# Patient Record
Sex: FEMALE | Race: WHITE | NOT HISPANIC OR LATINO | Employment: OTHER | URBAN - METROPOLITAN AREA
[De-identification: names, ages, dates, MRNs, and addresses within clinical notes are randomized per-mention and may not be internally consistent; named-entity substitution may affect disease eponyms.]

---

## 2019-01-23 ENCOUNTER — OFFICE VISIT (OUTPATIENT)
Dept: MEDICAL GROUP | Facility: MEDICAL CENTER | Age: 69
End: 2019-01-23
Payer: MEDICARE

## 2019-01-23 ENCOUNTER — HOSPITAL ENCOUNTER (OUTPATIENT)
Facility: MEDICAL CENTER | Age: 69
End: 2019-01-23
Attending: INTERNAL MEDICINE
Payer: MEDICARE

## 2019-01-23 VITALS
OXYGEN SATURATION: 93 % | HEIGHT: 65 IN | DIASTOLIC BLOOD PRESSURE: 80 MMHG | WEIGHT: 141 LBS | SYSTOLIC BLOOD PRESSURE: 130 MMHG | HEART RATE: 109 BPM | TEMPERATURE: 99.4 F | BODY MASS INDEX: 23.49 KG/M2

## 2019-01-23 DIAGNOSIS — E06.3 HASHIMOTO'S DISEASE: ICD-10-CM

## 2019-01-23 DIAGNOSIS — Z12.11 SCREENING FOR COLON CANCER: ICD-10-CM

## 2019-01-23 DIAGNOSIS — Z23 NEED FOR VACCINATION: ICD-10-CM

## 2019-01-23 DIAGNOSIS — N89.8 VAGINAL ITCHING: ICD-10-CM

## 2019-01-23 DIAGNOSIS — J44.9 CHRONIC OBSTRUCTIVE PULMONARY DISEASE, UNSPECIFIED COPD TYPE (HCC): ICD-10-CM

## 2019-01-23 DIAGNOSIS — F17.200 TOBACCO DEPENDENCE: ICD-10-CM

## 2019-01-23 LAB
APPEARANCE UR: CLEAR
BILIRUB UR STRIP-MCNC: NEGATIVE MG/DL
COLOR UR AUTO: YELLOW
GLUCOSE UR STRIP.AUTO-MCNC: NEGATIVE MG/DL
KETONES UR STRIP.AUTO-MCNC: NEGATIVE MG/DL
LEUKOCYTE ESTERASE UR QL STRIP.AUTO: NORMAL
NITRITE UR QL STRIP.AUTO: NEGATIVE
PH UR STRIP.AUTO: 6.5 [PH] (ref 5–8)
PROT UR QL STRIP: NEGATIVE MG/DL
RBC UR QL AUTO: NEGATIVE
SP GR UR STRIP.AUTO: 1.01
UROBILINOGEN UR STRIP-MCNC: NORMAL MG/DL

## 2019-01-23 PROCEDURE — 87510 GARDNER VAG DNA DIR PROBE: CPT

## 2019-01-23 PROCEDURE — 87660 TRICHOMONAS VAGIN DIR PROBE: CPT

## 2019-01-23 PROCEDURE — 81002 URINALYSIS NONAUTO W/O SCOPE: CPT | Performed by: INTERNAL MEDICINE

## 2019-01-23 PROCEDURE — 90715 TDAP VACCINE 7 YRS/> IM: CPT | Performed by: INTERNAL MEDICINE

## 2019-01-23 PROCEDURE — 99204 OFFICE O/P NEW MOD 45 MIN: CPT | Mod: 25 | Performed by: INTERNAL MEDICINE

## 2019-01-23 PROCEDURE — 90471 IMMUNIZATION ADMIN: CPT | Performed by: INTERNAL MEDICINE

## 2019-01-23 PROCEDURE — 87480 CANDIDA DNA DIR PROBE: CPT

## 2019-01-23 ASSESSMENT — PATIENT HEALTH QUESTIONNAIRE - PHQ9: CLINICAL INTERPRETATION OF PHQ2 SCORE: 0

## 2019-01-23 NOTE — PROGRESS NOTES
CC:  Diagnoses of Hashimoto's disease, Screening for colon cancer, Need for vaccination, Vaginal itching, and Tobacco dependence were pertinent to this visit.    HISTORY OF THE PRESENT ILLNESS: Patient is a 68 y.o. female. This pleasant patient is here today to discuss her vaginal/anal symptoms.    Health Maintenance: Her mammogram report will be scanned it was normal 12/8/17, she does not wish to repeat it at this time, asymptomatic.  She says she is due for colonoscopy this was ordered.  She says she is also due for Tdap, this was ordered.  She is already had shingles and Prevnar vaccines, this will be updated.    Recently moved back to [facility] from [place].  For the last year she has had itching and burning intermittently in her vaginal anal region associated with occasionally a fishlike odor and yellow to brown discharge.  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  Most recently she has been treated with oral fluconazole, nystatin another steroid/fungal creams and currently using over-the-counter antifungal/bacterial medication.  She denies any symptoms that she feels is overtly suggestive of UTI skin is a little sore because of redness with urination.  Pending gynecology appointment with Dr. Stephenson in April, she would like to have a sooner gynecology evaluation.    COPD with history of 50-pack-year smoking history.  She is ready to quit smoking, she says she has patches at home.  In the past it sounds like she took Wellbutrin and experience a sensation of brain swelling.  She denies any active pulmonary symptoms, she does not feel she needs an inhaler.    Noted blood pressure and heart rate.  Heart rate is elevated because she rushed in the clinic.  She is formally an LPN and at home she checked her blood pressure is always around 115/70.    Has been maintained on levothyroxine for Hashimoto's disease,  currently she feels that she is probably euthyroid.      Allergies include tape; Augmentin; Ciprofloxacin; and Other misc    Current Outpatient Prescriptions Ordered in [facility]   Medication Sig Dispense Refill   • levothyroxine (LEVOXYL) 88 MCG TABS Take 88 mcg by mouth every day.       No current [facility]-ordered facility-administered medications on file.        Past Medical History:   Diagnosis Date   • Cancer (HCC)     hpv cervical   • Conjunctivitis    • COPD (chronic obstructive pulmonary disease) (HCC)    • Hypothyroid     Hashimoto's       Past Surgical History:   Procedure Laterality Date   • HYSTERECTOMY ROBOTIC  11/6/08    Performed by MARIANELA LAZO at SURGERY [facility] ORS   • CERVICAL CONIZATION  8/8/08    Performed by SEKOU PEDERSEN at SURGERY SAME DAY [facility] ORS   • ABDOMINAL HYSTERECTOMY TOTAL      2008   • KNEE ORIF      right   • NASAL POLYPECTOMY     • RECTAL POLYPECTOMY     • TONSILLECTOMY     • TUBAL LIGATION         Social History   Substance Use Topics   • Smoking status: Current Every Day Smoker     Packs/day: 1.00     Years: 50.00     Types: Cigarettes   • Smokeless tobacco: Not on file   • Alcohol use Yes      Comment: rarely approx once year       Social History     Social History Narrative   • No narrative on file       Family History   Problem Relation Age of Onset   • Cancer Mother         lung   • Cancer Father         lung   • Heart Disease Maternal Grandfather        ROS:     - Constitutional: Negative for fever, chills, unexpected weight change, and fatigue/generalized weakness.     - HEENT: Negative for headaches, vision changes, hearing changes, ear pain, ear discharge, rhinorrhea, sinus congestion, sore throat, and neck pain.      - Respiratory: Negative for cough, sputum production, chest congestion, dyspnea, wheezing, and crackles.      - Cardiovascular: Negative for chest pain, palpitations, orthopnea, and bilateral lower extremity edema.     - Gastrointestinal: Negative for  "heartburn, nausea, vomiting, abdominal pain, hematochezia, melena, diarrhea, constipation, and greasy/foul-smelling stools.     - Genitourinary:see hpi    - Musculoskeletal: Negative for myalgias, back pain, and joint pain.     - Skin: Negative for rash, itching, cyanotic skin color change.     - Neurological: Negative for dizziness, tingling, tremors, focal sensory deficit, focal weakness and headaches.     - Endo/Heme/Allergies: Does not bruise/bleed easily.     - Psychiatric/Behavioral: Negative for depression, suicidal/homicidal ideation and memory loss.          .      Exam: Blood pressure 130/80, pulse (!) 109, temperature 37.4 °C (99.4 °F), temperature source Temporal, height 1.651 m (5' 5\"), weight 64 kg (141 lb), SpO2 93 %. Body mass index is 23.46 kg/m².    General: Normal appearing. No distress.  HEENT: Normocephalic. Eyes conjunctiva clear lids without ptosis, pupils equal and reactive to light   Neck: Supple  Pulmonary: Clear to ausculation.  Normal effort. No rales, ronchi, or wheezing.  Cardiovascular: Regular rate and rhythm without murmur.   Abdomen: Soft, nontender, nondistended. Normal bowel sounds.   Neurologic: Cranial nerves grossly nonfocal  Skin: Warm and dry.  No obvious lesions.  Musculoskeletal: Normal gait. No extremity cyanosis, clubbing, or edema.  Psych: Normal mood and affect. Alert and oriented x3. Judgment and insight is normal.    Please note that this dictation was created using voice recognition software. I have made every reasonable attempt to correct obvious errors, but I expect that there are errors of grammar and possibly content that I did not discover before finalizing the note.    Medical decision making    1. Hashimoto's disease  Continue levothyroxine, chronic stable condition  - CBC WITH DIFFERENTIAL; Future  - COMP METABOLIC PANEL; Future  - Lipid Profile; Future  - TSH WITH REFLEX TO FT4; Future    2. Screening for colon cancer  Currently asymptomatic  - REFERRAL TO " GI FOR COLONOSCOPY    3. Need for vaccination  - Tdap Vaccine =>8YO IM    4. Vaginal itching, new issue  Will rule out bacterial vaginosis today.  Given her prolonged course of her symptoms and menopausal state patient would like to see a gynecologist.  - VAGINAL PATHOGENS DNA PANEL; Future  - REFERRAL TO GYNECOLOGY  - POCT Urinalysis    5. Tobacco dependence  Patient has nicotine patches, will monitor    6. Chronic obstructive pulmonary disease, unspecified COPD type (HCC)  Stable, chronic medical condition    Return to clinic 2 months

## 2019-01-24 DIAGNOSIS — N89.8 VAGINAL ITCHING: ICD-10-CM

## 2019-01-24 LAB
CANDIDA DNA VAG QL PROBE+SIG AMP: NEGATIVE
G VAGINALIS DNA VAG QL PROBE+SIG AMP: NEGATIVE
T VAGINALIS DNA VAG QL PROBE+SIG AMP: NEGATIVE

## 2019-01-28 ENCOUNTER — TELEPHONE (OUTPATIENT)
Dept: MEDICAL GROUP | Facility: MEDICAL CENTER | Age: 69
End: 2019-01-28

## 2019-01-28 NOTE — TELEPHONE ENCOUNTER
Phone Number Called: [phone] (home)      Message: Left message for patient to call us back about results.     Left Message for patient to call back: yes

## 2019-01-28 NOTE — TELEPHONE ENCOUNTER
----- Message from Danika Carty M.D. sent at 1/25/2019 12:44 PM PST -----  Please call pt, let her know test result was negative.

## 2019-01-29 NOTE — TELEPHONE ENCOUNTER
Phone Number Called: [phone] (home)      Message: Tried to call patient but now the phone number is not working. Unable to leave a message today.     Left Message for patient to call back: N\A

## 2019-03-04 ENCOUNTER — OFFICE VISIT (OUTPATIENT)
Dept: URGENT CARE | Facility: CLINIC | Age: 69
End: 2019-03-04
Payer: MEDICARE

## 2019-03-04 ENCOUNTER — APPOINTMENT (OUTPATIENT)
Dept: RADIOLOGY | Facility: IMAGING CENTER | Age: 69
End: 2019-03-04
Attending: PHYSICIAN ASSISTANT
Payer: MEDICARE

## 2019-03-04 VITALS
OXYGEN SATURATION: 93 % | TEMPERATURE: 97.9 F | BODY MASS INDEX: 23.32 KG/M2 | HEIGHT: 65 IN | HEART RATE: 90 BPM | RESPIRATION RATE: 20 BRPM | DIASTOLIC BLOOD PRESSURE: 80 MMHG | SYSTOLIC BLOOD PRESSURE: 120 MMHG | WEIGHT: 140 LBS

## 2019-03-04 DIAGNOSIS — R09.02 HYPOXIA: ICD-10-CM

## 2019-03-04 DIAGNOSIS — R05.3 PERSISTENT COUGH FOR 3 WEEKS OR LONGER: ICD-10-CM

## 2019-03-04 DIAGNOSIS — J44.1 COPD EXACERBATION (HCC): Primary | ICD-10-CM

## 2019-03-04 PROCEDURE — 99214 OFFICE O/P EST MOD 30 MIN: CPT | Performed by: PHYSICIAN ASSISTANT

## 2019-03-04 PROCEDURE — 71046 X-RAY EXAM CHEST 2 VIEWS: CPT | Mod: TC,FY | Performed by: PHYSICIAN ASSISTANT

## 2019-03-04 RX ORDER — DOXYCYCLINE HYCLATE 100 MG
100 TABLET ORAL 2 TIMES DAILY
Qty: 10 TAB | Refills: 0 | Status: SHIPPED | OUTPATIENT
Start: 2019-03-04 | End: 2019-03-09

## 2019-03-04 RX ORDER — PREDNISONE 20 MG/1
TABLET ORAL
Qty: 15 TAB | Refills: 0 | Status: SHIPPED | OUTPATIENT
Start: 2019-03-04 | End: 2019-04-27 | Stop reason: CLARIF

## 2019-03-04 ASSESSMENT — ENCOUNTER SYMPTOMS
CHILLS: 1
SINUS PAIN: 0
NAUSEA: 1
SHORTNESS OF BREATH: 1
VOMITING: 0
SORE THROAT: 1
SPUTUM PRODUCTION: 1
WHEEZING: 1
CONSTIPATION: 0
COUGH: 1
MYALGIAS: 0
DIARRHEA: 0
FEVER: 1
ABDOMINAL PAIN: 0

## 2019-03-04 ASSESSMENT — COPD QUESTIONNAIRES: COPD: 1

## 2019-03-04 NOTE — PROGRESS NOTES
Subjective:   Guerda Boateng is a 68 y.o. female who presents for Cough (almost 2 wks dry cough, chest congestion , SOB)        Cough   This is a new problem. The cough is productive of sputum. Associated symptoms include chills, ear congestion, a fever, nasal congestion, a sore throat, shortness of breath and wheezing. Pertinent negatives include no ear pain or myalgias. Risk factors: Smoker. Her past medical history is significant for bronchitis, COPD (Untreated) and pneumonia. There is no history of asthma.     No flu shot. Pt was treated for influenza on 2/27/19 with tamiflu. She completed last dose yesterday.     Review of Systems   Constitutional: Positive for chills and fever. Negative for malaise/fatigue.   HENT: Positive for congestion and sore throat. Negative for ear pain and sinus pain.    Respiratory: Positive for cough, sputum production, shortness of breath and wheezing.    Gastrointestinal: Positive for nausea. Negative for abdominal pain, constipation, diarrhea and vomiting.   Musculoskeletal: Negative for myalgias.   All other systems reviewed and are negative.      PMH:  has a past medical history of Cancer ([facility]); Conjunctivitis; COPD (chronic obstructive pulmonary disease) ([facility]); and Hypothyroid.  MEDS:   Current Outpatient Prescriptions:   •  predniSONE (DELTASONE) 20 MG Tab, Take 40 mg PO daily for 5 days then 20 mg PO for 5 days, Disp: 15 Tab, Rfl: 0  •  doxycycline (VIBRAMYCIN) 100 MG Tab, Take 1 Tab by mouth 2 times a day for 5 days., Disp: 10 Tab, Rfl: 0  •  levothyroxine (LEVOXYL) 88 MCG TABS, Take 88 mcg by mouth every day., Disp: , Rfl:   ALLERGIES:   Allergies   Allergen Reactions   • Tape Itching     Adhesive tape   • Augmentin Itching   • Ciprofloxacin Shortness of Breath   • Other Misc      Formaldehyde  Weed killer  Leather glue     SURGHX:   Past Surgical History:   Procedure Laterality Date   • HYSTERECTOMY ROBOTIC  11/6/08    Performed by MARIANELA LAZO at SURGERY [facility]  "ORS   • CERVICAL CONIZATION  8/8/08    Performed by SEKOU PEDERSEN at SURGERY SAME DAY [facility] ORS   • ABDOMINAL HYSTERECTOMY TOTAL      2008   • KNEE ORIF      right   • NASAL POLYPECTOMY     • RECTAL POLYPECTOMY     • TONSILLECTOMY     • TUBAL LIGATION       SOCHX:  reports that she has been smoking Cigarettes.  She has a 50.00 pack-year smoking history. She has never used smokeless tobacco. She reports that she drinks alcohol. She reports that she does not use drugs.  Family History   Problem Relation Age of Onset   • Cancer Mother         lung   • Cancer Father         lung   • Heart Disease Maternal Grandfather         Objective:   /80 (BP Location: Left arm, Patient Position: Sitting, BP Cuff Size: Adult)   Pulse 90   Temp 36.6 °C (97.9 °F) (Temporal)   Resp 20   Ht 1.651 m (5' 5\")   Wt 63.5 kg (140 lb)   SpO2 93%   BMI 23.30 kg/m²     Physical Exam   Constitutional: She is oriented to person, place, and time. She appears well-developed and well-nourished. No distress.   HENT:   Head: Normocephalic and atraumatic.   Right Ear: Hearing, tympanic membrane and ear canal normal.   Left Ear: Hearing, tympanic membrane and ear canal normal.   Nose: Nose normal.   Mouth/Throat: Uvula is midline, oropharynx is clear and moist and mucous membranes are normal.   Eyes: Pupils are equal, round, and reactive to light. Conjunctivae are normal.   Neck: Normal range of motion. Neck supple. No tracheal deviation present.   Cardiovascular: Normal rate and regular rhythm.    Pulmonary/Chest: Effort normal and breath sounds normal. No respiratory distress. She has no wheezes. She has no rales.   Lymphadenopathy:     She has cervical adenopathy.   Neurological: She is alert and oriented to person, place, and time.   Skin: Skin is warm and dry. Capillary refill takes less than 2 seconds.   Psychiatric: She has a normal mood and affect. Her behavior is normal.   Vitals reviewed.         FINDINGS:  The heart is " normal in size.  Apical blebs and pleural thickening.  Hyperexpanded lungs.  No consolidation or pleural effusion.   Impression       Apical scarring. No consolidation.           Assessment/Plan:     1. COPD exacerbation (HCC)  predniSONE (DELTASONE) 20 MG Tab    doxycycline (VIBRAMYCIN) 100 MG Tab   2. Persistent cough for 3 weeks or longer  DX-CHEST-2 VIEWS   3. Hypoxia  DX-CHEST-2 VIEWS     Per my read, no active disease or consolidation seen on x-ray.  Agree with radiology report.     Supportive care reviewed.  Follow-up with primary care provider within 1 week.  If symptoms worsen or persist return to clinic immediately for reevaluation. Red flags and STRICT emergency room precautions discussed.  Patient verbalized understanding of information.

## 2019-03-20 ENCOUNTER — TELEPHONE (OUTPATIENT)
Dept: MEDICAL GROUP | Facility: MEDICAL CENTER | Age: 69
End: 2019-03-20

## 2019-03-20 DIAGNOSIS — E06.3 HASHIMOTO'S DISEASE: ICD-10-CM

## 2019-03-20 NOTE — TELEPHONE ENCOUNTER
1. Caller Name: Guerda Boateng                                           Call Back Number: [phone] (home)         Patient approves a detailed voicemail message: yes    2. SPECIFIC Action To Be Taken: referral needed    3. Diagnosis/Clinical Reason for Request: Thyroid problems     4. Specialty & Provider Name/Lab/Imaging Location:  [facility]     5. Is appointment scheduled for requested order/referral: no    Patient was informed they will receive a return phone call from the office ONLY if there are any questions before processing their request. Advised to call back if they haven't received a call from the referral department in 5 days.

## 2019-04-02 LAB
ALBUMIN SERPL-MCNC: 4.3 G/DL (ref 3.6–4.8)
ALBUMIN/GLOB SERPL: 2 {RATIO} (ref 1.2–2.2)
ALP SERPL-CCNC: 65 IU/L (ref 39–117)
ALT SERPL-CCNC: 9 IU/L (ref 0–32)
APPEARANCE UR: CLEAR
AST SERPL-CCNC: 15 IU/L (ref 0–40)
BACTERIA #/AREA URNS HPF: ABNORMAL /[HPF]
BASOPHILS # BLD AUTO: 0.1 X10E3/UL (ref 0–0.2)
BASOPHILS NFR BLD AUTO: 1 %
BILIRUB SERPL-MCNC: 0.5 MG/DL (ref 0–1.2)
BILIRUB UR QL STRIP: NEGATIVE
BUN SERPL-MCNC: 16 MG/DL (ref 8–27)
BUN/CREAT SERPL: 22 (ref 12–28)
CALCIUM SERPL-MCNC: 9.4 MG/DL (ref 8.7–10.3)
CASTS URNS MICRO: ABNORMAL
CASTS URNS QL MICRO: PRESENT /LPF
CHLORIDE SERPL-SCNC: 103 MMOL/L (ref 96–106)
CHOLEST SERPL-MCNC: 197 MG/DL (ref 100–199)
CO2 SERPL-SCNC: 24 MMOL/L (ref 20–29)
COLOR UR: YELLOW
CREAT SERPL-MCNC: 0.73 MG/DL (ref 0.57–1)
CRYSTALS URNS MICRO: ABNORMAL
EOSINOPHIL # BLD AUTO: 0.3 X10E3/UL (ref 0–0.4)
EOSINOPHIL NFR BLD AUTO: 5 %
EPI CELLS #/AREA URNS HPF: ABNORMAL /HPF
ERYTHROCYTE [DISTWIDTH] IN BLOOD BY AUTOMATED COUNT: 14.9 % (ref 12.3–15.4)
GLOBULIN SER CALC-MCNC: 2.1 G/DL (ref 1.5–4.5)
GLUCOSE SERPL-MCNC: 105 MG/DL (ref 65–99)
GLUCOSE UR QL: NEGATIVE
HCT VFR BLD AUTO: 43.1 % (ref 34–46.6)
HDLC SERPL-MCNC: 58 MG/DL
HGB BLD-MCNC: 14.4 G/DL (ref 11.1–15.9)
HGB UR QL STRIP: NEGATIVE
IMM GRANULOCYTES # BLD AUTO: 0 X10E3/UL (ref 0–0.1)
IMM GRANULOCYTES NFR BLD AUTO: 0 %
IMMATURE CELLS  115398: NORMAL
KETONES UR QL STRIP: NEGATIVE
LABORATORY COMMENT REPORT: ABNORMAL
LDLC SERPL CALC-MCNC: 118 MG/DL (ref 0–99)
LEUKOCYTE ESTERASE UR QL STRIP: ABNORMAL
LYMPHOCYTES # BLD AUTO: 1.6 X10E3/UL (ref 0.7–3.1)
LYMPHOCYTES NFR BLD AUTO: 22 %
MCH RBC QN AUTO: 30 PG (ref 26.6–33)
MCHC RBC AUTO-ENTMCNC: 33.4 G/DL (ref 31.5–35.7)
MCV RBC AUTO: 90 FL (ref 79–97)
MICRO URNS: ABNORMAL
MONOCYTES # BLD AUTO: 0.8 X10E3/UL (ref 0.1–0.9)
MONOCYTES NFR BLD AUTO: 10 %
MORPHOLOGY BLD-IMP: NORMAL
MUCOUS THREADS URNS QL MICRO: PRESENT
NEUTROPHILS # BLD AUTO: 4.5 X10E3/UL (ref 1.4–7)
NEUTROPHILS NFR BLD AUTO: 62 %
NITRITE UR QL STRIP: NEGATIVE
NRBC BLD AUTO-RTO: NORMAL %
PH UR STRIP: 5.5 [PH] (ref 5–7.5)
PLATELET # BLD AUTO: 256 X10E3/UL (ref 150–379)
POTASSIUM SERPL-SCNC: 4.6 MMOL/L (ref 3.5–5.2)
PROT SERPL-MCNC: 6.4 G/DL (ref 6–8.5)
PROT UR QL STRIP: NEGATIVE
RBC # BLD AUTO: 4.8 X10E6/UL (ref 3.77–5.28)
RBC #/AREA URNS HPF: ABNORMAL /HPF
RENAL EPI CELLS #/AREA URNS HPF: ABNORMAL /[HPF]
SODIUM SERPL-SCNC: 140 MMOL/L (ref 134–144)
SP GR UR: 1.02 (ref 1–1.03)
T VAGINALIS URNS QL MICRO: ABNORMAL
TRIGL SERPL-MCNC: 105 MG/DL (ref 0–149)
TSH SERPL DL<=0.005 MIU/L-ACNC: 0.78 UIU/ML (ref 0.45–4.5)
UNIDENT CRYS URNS QL MICRO: PRESENT
URNS CMNT MICRO: ABNORMAL
UROBILINOGEN UR STRIP-MCNC: 0.2 MG/DL (ref 0.2–1)
VLDLC SERPL CALC-MCNC: 21 MG/DL (ref 5–40)
WBC # BLD AUTO: 7.3 X10E3/UL (ref 3.4–10.8)
WBC #/AREA URNS HPF: ABNORMAL /HPF
YEAST #/AREA URNS HPF: ABNORMAL /[HPF]

## 2019-04-25 ENCOUNTER — HOSPITAL ENCOUNTER (OUTPATIENT)
Facility: MEDICAL CENTER | Age: 69
End: 2019-04-25
Attending: OBSTETRICS & GYNECOLOGY
Payer: MEDICARE

## 2019-04-25 ENCOUNTER — GYNECOLOGY VISIT (OUTPATIENT)
Dept: OBGYN | Facility: MEDICAL CENTER | Age: 69
End: 2019-04-25
Payer: MEDICARE

## 2019-04-25 VITALS
SYSTOLIC BLOOD PRESSURE: 140 MMHG | DIASTOLIC BLOOD PRESSURE: 90 MMHG | BODY MASS INDEX: 22.33 KG/M2 | WEIGHT: 134 LBS | HEIGHT: 65 IN

## 2019-04-25 DIAGNOSIS — Z85.41 HX OF CERVICAL CANCER: ICD-10-CM

## 2019-04-25 DIAGNOSIS — N95.2 VAGINAL ATROPHY: ICD-10-CM

## 2019-04-25 DIAGNOSIS — L90.0 LICHEN SCLEROSUS: ICD-10-CM

## 2019-04-25 PROCEDURE — 99203 OFFICE O/P NEW LOW 30 MIN: CPT | Performed by: OBSTETRICS & GYNECOLOGY

## 2019-04-25 PROCEDURE — 87624 HPV HI-RISK TYP POOLED RSLT: CPT

## 2019-04-25 PROCEDURE — 88175 CYTOPATH C/V AUTO FLUID REDO: CPT

## 2019-04-25 RX ORDER — TRIAMCINOLONE ACETONIDE 1 MG/G
OINTMENT TOPICAL
Qty: 1 TUBE | Refills: 3 | Status: SHIPPED | OUTPATIENT
Start: 2019-04-25 | End: 2019-04-27 | Stop reason: CLARIF

## 2019-04-25 NOTE — PROGRESS NOTES
GYN Visit    CC: vaginal irritation/itching    HPI:  68 y.o.  reports reports itching and burning in the vaginal area for the last several years.  Reports that she was, initially given nystatin for this which didn't really helped.  Last exam had in 2018.  Was given combination of triamcinolone cream and nystatin, uses for 2 days and reports he did not feel any better so she stopped using it.  Pt had vaginal pathogens neg 2019.  Denies vaginal discharge, vaginal bleeding  Using an OTC lotion cream externally right now which helps some.  Also uses epsom salts.    Feels like there is a small lump on the outside both on the right as well as on the left.  Reports that it hurts when she pees but only when the urine runs over the outside.  Has been checked for UTI and been negative.    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 Garcia in .  Not sexually active for many years.    ROS:  gen: denies fevers, endorses weight loss, fatigue  abd: denies abd pain, endorses nausea, denies vomiting.  Denies bloody stool, diarrhea, constipation   : see HPI    OB History    Para Term  AB Living   3 3 3     3   SAB TAB Ectopic Molar Multiple Live Births             3      # Outcome Date GA Lbr Carlos Alberto/2nd Weight Sex Delivery Anes PTL Lv   3 Term      Vag-Spont   THEODORA   2 Term     M Vag-Spont   THEODORA   1 Term     F Vag-Spont   THEODORA        GYNHx:  Hx of acenocarcinoma in situ of the cervix  s/p TLH/BSO  Not sexually active    Tape; Augmentin; Ciprofloxacin; and Other misc  Past Medical History:   Diagnosis Date   • Arthritis    • Back pain    • Bleeding from the nose    • Cancer ([facility])     hpv cervical   • Conjunctivitis    • COPD (chronic obstructive pulmonary disease) ([facility])    • COPD (chronic obstructive pulmonary disease) ([facility])    • Hay fever    • Hemorrhoids    • Hypothyroid     Hashimoto's   • Kidney disease    • Measles    • Mumps    • Pneumonia    • Sinusitis    • Urinary tract infection    •  "Venereal disease        Physical Exam:  /90 (BP Location: Left arm, Patient Position: Sitting)   Ht 1.651 m (5' 5\")   Wt 60.8 kg (134 lb)   LMP  (LMP Unknown)   Breastfeeding? No   BMI 22.30 kg/m²   gen: AAO, NAD, mood and affect appropriate  CV: RRR, no LE edema  Resp; ctab, normal respiratory effort  abd: soft, NT, ND, no masses, no hepatosplenomegaly, no hernias  : NEFG, normal urethral meatus, significant vaginal atrophy noted, also with tight appearing skin, some loss of labial architecture noted, normal anus and perineum again with significantly irritated skin noticed appears dry and flaky bilaterally.  Bimanual: Uterus surgically absent, no pelvic masses or tenderness   skin: warm/dry, no lesions        A/P: 68 y.o.  w/ vulvitis, vaginal atrophy  1. Lichen sclerosus     2. Vaginal atrophy     3. Hx of cervical cancer  THINPREP PAP WITH HPV     Lichen sclerosis versus lichen simplex chronicus suspected.  Will treat with topical triamcinolone.  Patient initially seems resistant to trying triamcinolone since she had tried triamcinolone cream combination with the statin in the past.  Discussed that she will need to use this for more than 2 days prior to seeing significant effect, also discussed that I do not believe she needs anti-yeast component, and that the ointment rather than cream will likely feel better and be more soothing to the area.  Used 2x daily for 2wks then can space out use as needed.  Advised to return if worsening despite treatment, also discussed that if not responding to treatment would recommend consideration for vulvar biopsy.  No findings acutely concerning for malignancy today for focal lesions.  Significant vaginal atrophy noted including around the urethra and at the introitus.  Recommend treatment with vaginal estrogen.  Prescription sent.  Advised to start daily for 2 weeks and to use 2 times weekly.    Vaginal Pap today given history of carcinoma in situ of the " cervix       RTC 2 to 3 months for repeat exam    Jesenia Carmichael MD  [facility], Women's Health

## 2019-04-26 DIAGNOSIS — Z85.41 HX OF CERVICAL CANCER: ICD-10-CM

## 2019-04-26 LAB
CYTOLOGY REG CYTOL: NORMAL
HPV HR 12 DNA CVX QL NAA+PROBE: NEGATIVE
HPV16 DNA SPEC QL NAA+PROBE: NEGATIVE
HPV18 DNA SPEC QL NAA+PROBE: NEGATIVE
SPECIMEN SOURCE: NORMAL

## 2019-04-27 ENCOUNTER — APPOINTMENT (OUTPATIENT)
Dept: RADIOLOGY | Facility: MEDICAL CENTER | Age: 69
End: 2019-04-27
Attending: EMERGENCY MEDICINE
Payer: MEDICARE

## 2019-04-27 ENCOUNTER — OFFICE VISIT (OUTPATIENT)
Dept: URGENT CARE | Facility: CLINIC | Age: 69
End: 2019-04-27
Payer: MEDICARE

## 2019-04-27 ENCOUNTER — HOSPITAL ENCOUNTER (OUTPATIENT)
Facility: MEDICAL CENTER | Age: 69
End: 2019-04-28
Attending: EMERGENCY MEDICINE | Admitting: INTERNAL MEDICINE
Payer: MEDICARE

## 2019-04-27 VITALS
HEIGHT: 65 IN | SYSTOLIC BLOOD PRESSURE: 166 MMHG | RESPIRATION RATE: 16 BRPM | DIASTOLIC BLOOD PRESSURE: 92 MMHG | WEIGHT: 134 LBS | OXYGEN SATURATION: 95 % | BODY MASS INDEX: 22.33 KG/M2 | HEART RATE: 96 BPM | TEMPERATURE: 98.1 F

## 2019-04-27 DIAGNOSIS — R07.89 OTHER CHEST PAIN: ICD-10-CM

## 2019-04-27 DIAGNOSIS — F41.9 ANXIETY: ICD-10-CM

## 2019-04-27 DIAGNOSIS — R07.9 CHEST PAIN, UNSPECIFIED TYPE: ICD-10-CM

## 2019-04-27 PROBLEM — E87.6 HYPOKALEMIA: Status: ACTIVE | Noted: 2019-04-27

## 2019-04-27 LAB
ALBUMIN SERPL BCP-MCNC: 5 G/DL (ref 3.2–4.9)
ALBUMIN/GLOB SERPL: 1.9 G/DL
ALP SERPL-CCNC: 69 U/L (ref 30–99)
ALT SERPL-CCNC: 11 U/L (ref 2–50)
ANION GAP SERPL CALC-SCNC: 11 MMOL/L (ref 0–11.9)
AST SERPL-CCNC: 17 U/L (ref 12–45)
BASOPHILS # BLD AUTO: 0.9 % (ref 0–1.8)
BASOPHILS # BLD: 0.09 K/UL (ref 0–0.12)
BILIRUB SERPL-MCNC: 0.9 MG/DL (ref 0.1–1.5)
BUN SERPL-MCNC: 13 MG/DL (ref 8–22)
CALCIUM SERPL-MCNC: 9.4 MG/DL (ref 8.4–10.2)
CHLORIDE SERPL-SCNC: 99 MMOL/L (ref 96–112)
CO2 SERPL-SCNC: 25 MMOL/L (ref 20–33)
CREAT SERPL-MCNC: 0.82 MG/DL (ref 0.5–1.4)
EKG IMPRESSION: NORMAL
EOSINOPHIL # BLD AUTO: 0.29 K/UL (ref 0–0.51)
EOSINOPHIL NFR BLD: 3 % (ref 0–6.9)
ERYTHROCYTE [DISTWIDTH] IN BLOOD BY AUTOMATED COUNT: 44.6 FL (ref 35.9–50)
GLOBULIN SER CALC-MCNC: 2.7 G/DL (ref 1.9–3.5)
GLUCOSE SERPL-MCNC: 107 MG/DL (ref 65–99)
HCT VFR BLD AUTO: 46.9 % (ref 37–47)
HGB BLD-MCNC: 15.6 G/DL (ref 12–16)
IMM GRANULOCYTES # BLD AUTO: 0.03 K/UL (ref 0–0.11)
IMM GRANULOCYTES NFR BLD AUTO: 0.3 % (ref 0–0.9)
LYMPHOCYTES # BLD AUTO: 2.22 K/UL (ref 1–4.8)
LYMPHOCYTES NFR BLD: 22.7 % (ref 22–41)
MCH RBC QN AUTO: 29.8 PG (ref 27–33)
MCHC RBC AUTO-ENTMCNC: 33.3 G/DL (ref 33.6–35)
MCV RBC AUTO: 89.7 FL (ref 81.4–97.8)
MONOCYTES # BLD AUTO: 0.65 K/UL (ref 0–0.85)
MONOCYTES NFR BLD AUTO: 6.7 % (ref 0–13.4)
NEUTROPHILS # BLD AUTO: 6.49 K/UL (ref 2–7.15)
NEUTROPHILS NFR BLD: 66.4 % (ref 44–72)
NRBC # BLD AUTO: 0 K/UL
NRBC BLD-RTO: 0 /100 WBC
PLATELET # BLD AUTO: 272 K/UL (ref 164–446)
PMV BLD AUTO: 9.7 FL (ref 9–12.9)
POTASSIUM SERPL-SCNC: 3.5 MMOL/L (ref 3.6–5.5)
PROT SERPL-MCNC: 7.7 G/DL (ref 6–8.2)
RBC # BLD AUTO: 5.23 M/UL (ref 4.2–5.4)
SODIUM SERPL-SCNC: 135 MMOL/L (ref 135–145)
TROPONIN I SERPL-MCNC: <0.02 NG/ML (ref 0–0.04)
WBC # BLD AUTO: 9.8 K/UL (ref 4.8–10.8)

## 2019-04-27 PROCEDURE — 84484 ASSAY OF TROPONIN QUANT: CPT | Mod: 91

## 2019-04-27 PROCEDURE — G0378 HOSPITAL OBSERVATION PER HR: HCPCS

## 2019-04-27 PROCEDURE — A9270 NON-COVERED ITEM OR SERVICE: HCPCS | Performed by: HOSPITALIST

## 2019-04-27 PROCEDURE — 80053 COMPREHEN METABOLIC PANEL: CPT

## 2019-04-27 PROCEDURE — 99220 PR INITIAL OBSERVATION CARE,LEVL III: CPT | Performed by: INTERNAL MEDICINE

## 2019-04-27 PROCEDURE — A9270 NON-COVERED ITEM OR SERVICE: HCPCS | Performed by: EMERGENCY MEDICINE

## 2019-04-27 PROCEDURE — 71045 X-RAY EXAM CHEST 1 VIEW: CPT

## 2019-04-27 PROCEDURE — 93005 ELECTROCARDIOGRAM TRACING: CPT

## 2019-04-27 PROCEDURE — 36415 COLL VENOUS BLD VENIPUNCTURE: CPT

## 2019-04-27 PROCEDURE — 93005 ELECTROCARDIOGRAM TRACING: CPT | Performed by: EMERGENCY MEDICINE

## 2019-04-27 PROCEDURE — A9270 NON-COVERED ITEM OR SERVICE: HCPCS | Performed by: INTERNAL MEDICINE

## 2019-04-27 PROCEDURE — 700102 HCHG RX REV CODE 250 W/ 637 OVERRIDE(OP): Performed by: EMERGENCY MEDICINE

## 2019-04-27 PROCEDURE — 700102 HCHG RX REV CODE 250 W/ 637 OVERRIDE(OP): Performed by: INTERNAL MEDICINE

## 2019-04-27 PROCEDURE — 700102 HCHG RX REV CODE 250 W/ 637 OVERRIDE(OP): Performed by: HOSPITALIST

## 2019-04-27 PROCEDURE — 83036 HEMOGLOBIN GLYCOSYLATED A1C: CPT

## 2019-04-27 PROCEDURE — 99285 EMERGENCY DEPT VISIT HI MDM: CPT

## 2019-04-27 PROCEDURE — 85025 COMPLETE CBC W/AUTO DIFF WBC: CPT

## 2019-04-27 RX ORDER — AMOXICILLIN 250 MG
2 CAPSULE ORAL 2 TIMES DAILY
Status: DISCONTINUED | OUTPATIENT
Start: 2019-04-27 | End: 2019-04-28 | Stop reason: HOSPADM

## 2019-04-27 RX ORDER — LEVOTHYROXINE SODIUM 88 UG/1
88 TABLET ORAL DAILY
Status: DISCONTINUED | OUTPATIENT
Start: 2019-04-28 | End: 2019-04-28 | Stop reason: HOSPADM

## 2019-04-27 RX ORDER — POLYETHYLENE GLYCOL 3350 17 G/17G
1 POWDER, FOR SOLUTION ORAL
Status: DISCONTINUED | OUTPATIENT
Start: 2019-04-27 | End: 2019-04-28 | Stop reason: HOSPADM

## 2019-04-27 RX ORDER — TRAZODONE HYDROCHLORIDE 50 MG/1
50 TABLET ORAL
Status: DISCONTINUED | OUTPATIENT
Start: 2019-04-27 | End: 2019-04-28 | Stop reason: HOSPADM

## 2019-04-27 RX ORDER — NICOTINE 21 MG/24HR
21 PATCH, TRANSDERMAL 24 HOURS TRANSDERMAL
Status: DISCONTINUED | OUTPATIENT
Start: 2019-04-27 | End: 2019-04-28 | Stop reason: HOSPADM

## 2019-04-27 RX ORDER — LORAZEPAM 1 MG/1
0.5 TABLET ORAL ONCE
Status: COMPLETED | OUTPATIENT
Start: 2019-04-27 | End: 2019-04-27

## 2019-04-27 RX ORDER — POTASSIUM CHLORIDE 20 MEQ/1
20 TABLET, EXTENDED RELEASE ORAL 2 TIMES DAILY
Status: COMPLETED | OUTPATIENT
Start: 2019-04-27 | End: 2019-04-28

## 2019-04-27 RX ORDER — ACETAMINOPHEN 325 MG/1
650 TABLET ORAL EVERY 6 HOURS PRN
Status: DISCONTINUED | OUTPATIENT
Start: 2019-04-27 | End: 2019-04-28 | Stop reason: HOSPADM

## 2019-04-27 RX ORDER — BISACODYL 10 MG
10 SUPPOSITORY, RECTAL RECTAL
Status: DISCONTINUED | OUTPATIENT
Start: 2019-04-27 | End: 2019-04-28 | Stop reason: HOSPADM

## 2019-04-27 RX ADMIN — LORAZEPAM 0.5 MG: 1 TABLET ORAL at 13:53

## 2019-04-27 RX ADMIN — ASPIRIN 325 MG: 325 TABLET, DELAYED RELEASE ORAL at 13:53

## 2019-04-27 RX ADMIN — TRAZODONE HYDROCHLORIDE 50 MG: 50 TABLET ORAL at 22:56

## 2019-04-27 RX ADMIN — POTASSIUM CHLORIDE 20 MEQ: 1500 TABLET, EXTENDED RELEASE ORAL at 20:52

## 2019-04-27 ASSESSMENT — PAIN DESCRIPTION - DESCRIPTORS: DESCRIPTORS: ACHING

## 2019-04-27 ASSESSMENT — ENCOUNTER SYMPTOMS
INSOMNIA: 1
WEAKNESS: 0
FEVER: 0
NERVOUS/ANXIOUS: 1
DEPRESSION: 0
ROS GI COMMENTS: BELCHING
VOMITING: 0
SHORTNESS OF BREATH: 1
TREMORS: 1
SORE THROAT: 0
ABDOMINAL PAIN: 0
EYE DISCHARGE: 0
POLYDIPSIA: 0
CHILLS: 0
DIAPHORESIS: 0
EYE REDNESS: 0
NAUSEA: 1
COUGH: 0

## 2019-04-27 ASSESSMENT — COGNITIVE AND FUNCTIONAL STATUS - GENERAL
DAILY ACTIVITIY SCORE: 24
SUGGESTED CMS G CODE MODIFIER MOBILITY: CH
SUGGESTED CMS G CODE MODIFIER DAILY ACTIVITY: CH
MOBILITY SCORE: 24

## 2019-04-27 ASSESSMENT — LIFESTYLE VARIABLES
TOTAL SCORE: 0
EVER HAD A DRINK FIRST THING IN THE MORNING TO STEADY YOUR NERVES TO GET RID OF A HANGOVER: NO
CONSUMPTION TOTAL: NEGATIVE
ALCOHOL_USE: YES
HOW MANY TIMES IN THE PAST YEAR HAVE YOU HAD 5 OR MORE DRINKS IN A DAY: 0
EVER_SMOKED: YES
EVER FELT BAD OR GUILTY ABOUT YOUR DRINKING: NO
HAVE PEOPLE ANNOYED YOU BY CRITICIZING YOUR DRINKING: NO
ON A TYPICAL DAY WHEN YOU DRINK ALCOHOL HOW MANY DRINKS DO YOU HAVE: 1
AVERAGE NUMBER OF DAYS PER WEEK YOU HAVE A DRINK CONTAINING ALCOHOL: .25
TOTAL SCORE: 0
HAVE YOU EVER FELT YOU SHOULD CUT DOWN ON YOUR DRINKING: NO
TOTAL SCORE: 0

## 2019-04-27 ASSESSMENT — PATIENT HEALTH QUESTIONNAIRE - PHQ9
1. LITTLE INTEREST OR PLEASURE IN DOING THINGS: NEARLY EVERY DAY
7. TROUBLE CONCENTRATING ON THINGS, SUCH AS READING THE NEWSPAPER OR WATCHING TELEVISION: NEARLY EVERY DAY
6. FEELING BAD ABOUT YOURSELF - OR THAT YOU ARE A FAILURE OR HAVE LET YOURSELF OR YOUR FAMILY DOWN: NOT AL ALL
8. MOVING OR SPEAKING SO SLOWLY THAT OTHER PEOPLE COULD HAVE NOTICED. OR THE OPPOSITE, BEING SO FIGETY OR RESTLESS THAT YOU HAVE BEEN MOVING AROUND A LOT MORE THAN USUAL: NOT AT ALL
9. THOUGHTS THAT YOU WOULD BE BETTER OFF DEAD, OR OF HURTING YOURSELF: NOT AT ALL
4. FEELING TIRED OR HAVING LITTLE ENERGY: NEARLY EVERY DAY
3. TROUBLE FALLING OR STAYING ASLEEP OR SLEEPING TOO MUCH: SEVERAL DAYS
SUM OF ALL RESPONSES TO PHQ9 QUESTIONS 1 AND 2: 6
2. FEELING DOWN, DEPRESSED, IRRITABLE, OR HOPELESS: NEARLY EVERY DAY
SUM OF ALL RESPONSES TO PHQ QUESTIONS 1-9: 14
5. POOR APPETITE OR OVEREATING: SEVERAL DAYS

## 2019-04-27 NOTE — ED NOTES
"This pt is referred to our facility by [facility] to F/U on  complaints of chest pain and anxiety since approximately 10 this AM. She states that she moved to [place] a few months ago and has had a very stressful time so far.   Chief Complaint   Patient presents with   • Chest Pain   • Anxiety     BP (!) 171/92   Pulse 96   Temp 37.1 °C (98.7 °F) (Temporal)   Resp 18   Ht 1.651 m (5' 5\")   Wt 60 kg (132 lb 4.4 oz)   LMP  (LMP Unknown)   SpO2 98%   BMI 22.01 kg/m²     "

## 2019-04-27 NOTE — ED NOTES
Pts friend Martha Marium #[phone], can be reached if needed, pt does not have family in town.

## 2019-04-27 NOTE — ED PROVIDER NOTES
CHIEF COMPLAINT  Chief Complaint   Patient presents with   • Chest Pain   • Anxiety       HPI  Guerda Boateng is a 68 y.o. female who presents to the emergency department complaining of chest pain.  The patient says that she has been extremely fatigued over the last couple of days she has been sleeping much more than usual and at 10 AM this morning she developed a pressure-like pain in the left chest with associated palpitations some nausea and mild shortness of breath.  This mostly has resolved she says she just has a mild soreness in the left side of the chest now she thinks may be that anxiety triggered this because she has been under a lot of stress lately.    REVIEW OF SYSTEMS no fever or chills no hemoptysis.  All other systems negative    PAST MEDICAL HISTORY  Past Medical History:   Diagnosis Date   • Arthritis    • Back pain    • Bleeding from the nose    • Cancer (HCC)     hpv cervical   • Conjunctivitis    • COPD (chronic obstructive pulmonary disease) (HCC)    • COPD (chronic obstructive pulmonary disease) (HCC)    • Hay fever    • Hemorrhoids    • Hypothyroid     Hashimoto's   • Kidney disease    • Measles    • Mumps    • Pneumonia    • Sinusitis    • Urinary tract infection    • Venereal disease        FAMILY HISTORY  Family History   Problem Relation Age of Onset   • Cancer Mother         lung   • Lung Disease Mother    • Cancer Father         lung   • Lung Disease Father    • Heart Disease Maternal Grandfather    • Other Daughter         gout   • Thyroid Daughter    • Allergies Daughter    • Cancer Daughter         thyroid   • Diabetes Son    • Allergies Son        SOCIAL HISTORY  Social History     Social History   • Marital status:      Spouse name: N/A   • Number of children: N/A   • Years of education: N/A     Social History Main Topics   • Smoking status: Current Every Day Smoker     Packs/day: 1.00     Years: 50.00     Types: Cigarettes   • Smokeless tobacco: Never Used   •  "Alcohol use No   • Drug use: No   • Sexual activity: Not Currently     Partners: Male     Birth control/ protection: Other-See Comments      Comment: none     Other Topics Concern   • Not on file     Social History Narrative   • No narrative on file       SURGICAL HISTORY  Past Surgical History:   Procedure Laterality Date   • HYSTERECTOMY ROBOTIC  11/6/08    Performed by MARIANELA LAZO at SURGERY [facility] ORS   • CERVICAL CONIZATION  8/8/08    Performed by SEKOU PEDERSEN at SURGERY SAME DAY [facility] ORS   • ABDOMINAL HYSTERECTOMY TOTAL      2008   • KNEE ORIF      right   • NASAL POLYPECTOMY     • RECTAL POLYPECTOMY     • TONSILLECTOMY     • TUBAL LIGATION         CURRENT MEDICATIONS  Home Medications     Reviewed by Benedict Kingston (Pharmacy Tech) on 04/27/19 at 1400  Med List Status: Complete   Medication Last Dose Status   levothyroxine (LEVOXYL) 88 MCG TABS 4/27/2019 Active                ALLERGIES  Allergies   Allergen Reactions   • Tape Itching     Adhesive tape   • Augmentin Itching   • Ciprofloxacin Shortness of Breath   • Other Misc      Formaldehyde  Weed killer  Leather glue       PHYSICAL EXAM  VITAL SIGNS: /63   Pulse 70   Temp 36.6 °C (97.8 °F) (Oral)   Resp 18   Ht 1.651 m (5' 5\")   Wt 60 kg (132 lb 4.4 oz)   LMP  (LMP Unknown)   SpO2 96%   Breastfeeding? No   BMI 22.01 kg/m²    Oxygen saturation is interpreted as adequate  Constitutional: Awake and nontoxic-appearing  HENT: Mucous membranes are moist throat clear  Eyes: No erythema discharge or jaundice  Neck: Trachea midline no JVD  Cardiovascular: Regular rate and rhythm  Lungs: Clear and equal bilaterally with no apparent difficulty breathing  Abdomen/Back: Soft nontender nondistended no peritoneal findings  Skin: Warm and dry  Musculoskeletal: No acute bony deformity, no leg edema or calf tenderness  Neurologic: Awake verbal moving all extremities    Laboratory  CBC shows white blood cell count of 9.8 hemoglobin is " adequate at 15.6 complete metabolic panel is unremarkable troponin is normal    EKG interpretation  Twelve-lead EKG shows sinus rhythm 101 beats, there is very slight ST depression in leads II and III and aVF as well as V5 through V6.  I do not have an old cardiogram immediately available for comparison    Radiology  DX-CHEST-PORTABLE (1 VIEW)   Final Result      Slight hyperinflation. No focal consolidation or pleural effusions.      NM-CARDIAC STRESS TEST    (Results Pending)     MEDICAL DECISION MAKING and DISPOSITION  In the emergency department an IV was established and the patient was placed on the cardiac monitor she was given aspirin and also Ativan for anxiety.  I reviewed the results thus far available with the patient.  I reviewed the case with the hospitalist and the patient is admitted for further evaluation and treatment    IMPRESSION  1.  Chest pain  2.  Anxiety  3.  Abnormal EKG         Electronically signed by: Herber Avelar, 4/27/2019 3:42 PM

## 2019-04-27 NOTE — CARE PLAN
Problem: Safety  Goal: Will remain free from injury  Outcome: PROGRESSING AS EXPECTED  Patient instructed on hospital safety and verbalized understanding

## 2019-04-27 NOTE — PROGRESS NOTES
Patient is a 68-year-old female who presents today with complaint of chest pain and anxiety.  States that she moved to [place] a few months ago and has had a very stressful time here since.  States that she believes her present symptoms were due to anxiety, however she began having intermittent chest pain this morning and is worried now about cardiac related chest pain.  No prior history of this.  Upon discussion with the patient, I advised her that I am able to do an EKG here in the office but ultimately I am unable to rule out cardiac related chest pain to the urgent care.  Patient states that she is very concerned about this possibility and at this time states that she would rather go to the emergency room for further evaluation.  Patient's vital signs are stable and within normal limits.  She is accompanied by a friend.  They proceeded to exit urgent care and go to ER at patient's request for further evaluation.

## 2019-04-28 ENCOUNTER — APPOINTMENT (OUTPATIENT)
Dept: RADIOLOGY | Facility: MEDICAL CENTER | Age: 69
End: 2019-04-28
Attending: INTERNAL MEDICINE
Payer: MEDICARE

## 2019-04-28 ENCOUNTER — PATIENT OUTREACH (OUTPATIENT)
Dept: HEALTH INFORMATION MANAGEMENT | Facility: OTHER | Age: 69
End: 2019-04-28

## 2019-04-28 VITALS
OXYGEN SATURATION: 97 % | SYSTOLIC BLOOD PRESSURE: 152 MMHG | TEMPERATURE: 97.4 F | WEIGHT: 132.28 LBS | DIASTOLIC BLOOD PRESSURE: 86 MMHG | HEART RATE: 75 BPM | RESPIRATION RATE: 18 BRPM | BODY MASS INDEX: 22.04 KG/M2 | HEIGHT: 65 IN

## 2019-04-28 PROBLEM — R73.03 PRE-DIABETES: Status: ACTIVE | Noted: 2019-04-28

## 2019-04-28 LAB
CHOLEST SERPL-MCNC: 209 MG/DL (ref 100–199)
EST. AVERAGE GLUCOSE BLD GHB EST-MCNC: 134 MG/DL
HBA1C MFR BLD: 6.3 % (ref 0–5.6)
HDLC SERPL-MCNC: 53 MG/DL
LDLC SERPL CALC-MCNC: 129 MG/DL
TRIGL SERPL-MCNC: 136 MG/DL (ref 0–149)

## 2019-04-28 PROCEDURE — 700102 HCHG RX REV CODE 250 W/ 637 OVERRIDE(OP): Performed by: INTERNAL MEDICINE

## 2019-04-28 PROCEDURE — 99217 PR OBSERVATION CARE DISCHARGE: CPT | Performed by: INTERNAL MEDICINE

## 2019-04-28 PROCEDURE — A9270 NON-COVERED ITEM OR SERVICE: HCPCS | Performed by: INTERNAL MEDICINE

## 2019-04-28 PROCEDURE — G0378 HOSPITAL OBSERVATION PER HR: HCPCS

## 2019-04-28 PROCEDURE — 78452 HT MUSCLE IMAGE SPECT MULT: CPT | Mod: 26 | Performed by: INTERNAL MEDICINE

## 2019-04-28 PROCEDURE — A9502 TC99M TETROFOSMIN: HCPCS

## 2019-04-28 PROCEDURE — 93018 CV STRESS TEST I&R ONLY: CPT | Performed by: INTERNAL MEDICINE

## 2019-04-28 PROCEDURE — 80061 LIPID PANEL: CPT

## 2019-04-28 PROCEDURE — 700111 HCHG RX REV CODE 636 W/ 250 OVERRIDE (IP)

## 2019-04-28 RX ORDER — HYDROXYZINE HYDROCHLORIDE 10 MG/1
10 TABLET, FILM COATED ORAL 3 TIMES DAILY PRN
Status: DISCONTINUED | OUTPATIENT
Start: 2019-04-28 | End: 2019-04-28 | Stop reason: HOSPADM

## 2019-04-28 RX ORDER — HYDROXYZINE HYDROCHLORIDE 10 MG/1
10 TABLET, FILM COATED ORAL 3 TIMES DAILY PRN
Qty: 30 TAB | Refills: 0 | Status: SHIPPED | OUTPATIENT
Start: 2019-04-28 | End: 2019-05-07

## 2019-04-28 RX ORDER — REGADENOSON 0.08 MG/ML
INJECTION, SOLUTION INTRAVENOUS
Status: COMPLETED
Start: 2019-04-28 | End: 2019-04-28

## 2019-04-28 RX ADMIN — POTASSIUM CHLORIDE 20 MEQ: 1500 TABLET, EXTENDED RELEASE ORAL at 05:28

## 2019-04-28 RX ADMIN — REGADENOSON 0.4 MG: 0.08 INJECTION, SOLUTION INTRAVENOUS at 09:29

## 2019-04-28 RX ADMIN — LEVOTHYROXINE SODIUM 88 MCG: 88 TABLET ORAL at 05:28

## 2019-04-28 RX ADMIN — HYDROXYZINE HYDROCHLORIDE 10 MG: 10 TABLET, FILM COATED ORAL at 11:30

## 2019-04-28 NOTE — PROGRESS NOTES
Gave bedside report to MÓNICA Toscano. Plan of care discussed. Safety precautions in place. Personal belongings and call light are with in reach. Patient has no additional needs at this time.

## 2019-04-28 NOTE — ASSESSMENT & PLAN NOTE
Chest pain is atypical but she is a smoker  Will rule her out with serial enzymes and subsequent stress testing

## 2019-04-28 NOTE — CARE PLAN
Problem: Safety  Goal: Will remain free from falls  Outcome: PROGRESSING AS EXPECTED  Pt low risk, up self for ambulation. educated to call for assistance for ambulation if feeling dizzy or weak.     Problem: Knowledge Deficit  Goal: Knowledge of disease process/condition, treatment plan, diagnostic tests, and medications will improve  Outcome: PROGRESSING AS EXPECTED  Discussed POC and stress test in the morning. Allowed time for questions, pt states all questions have been answered.

## 2019-04-28 NOTE — PROGRESS NOTES
Telemetry Shift Summary    Rhythm SR  HR Range 60-70  Ectopy rare PVC  Measurements 0.14/0.08/0.40        Normal Values  Rhythm SR  HR Range    Measurements 0.12-0.20 / 0.06-0.10  / 0.30-0.52

## 2019-04-28 NOTE — PROGRESS NOTES
"Assessment completed. Pt is mildly anxious about home situation, reports she just moved across the country to [place] and will have to move to [place] soon, states she is \"really stressed\" about this situation. Is requesting medication to help with sleep. Will report pts request to on call MD.  Reports continued chest pain rated 4/10, described as \"ache\" and reports feeling continued palpitations. Denies any SOB, light headedness, radiating pain, nausea. Declines need for any pain relief medication at this time.   "

## 2019-04-28 NOTE — H&P
"Hospital Medicine History & Physical Note    Date of Service  4/27/2019    Primary Care Physician  Danika Carty M.D.    Consultants  none    Code Status  Full    Chief Complaint  Chest pain and anxiety    History of Presenting Illness  68 y.o. female who presented 4/27/2019 with left-sided chest pressure and a feeling of indigestion, she thinks this is due to severe anxiety which is been worse lately, she just has uncomfortable sensation in her chest about a 7 out of 10 and now is starting to feel sore inside.  She felt like she had to belch but belching did not really change the pain.  She says she has not been sleeping well lately and wakens frequently and then she feels like sleeping all day, she has been severely fatigued for the last 2 days.  She thinks emotional stress makes it worse but has not really found any relieving factors, she is a little short of breath and has nausea but no diaphoresis.  She sometimes feels a little dizzy and fuzzy she has had a little bit of a headache for the last couple of days as well.  She describes the pain initially as sharp to the left side but as stated it is more dull now.  It radiated up to her left trapezoid area.  It has not radiated elsewhere.  She is never had prior similar pain.  She says when the pain is severe she trembles all over.    Review of Systems  Review of Systems   Constitutional: Positive for malaise/fatigue (2 days, sleeps most of day restlessly). Negative for chills, diaphoresis and fever.   HENT: Negative for congestion and sore throat.    Eyes: Negative for discharge and redness.   Respiratory: Positive for shortness of breath. Negative for cough.    Cardiovascular: Positive for chest pain.   Gastrointestinal: Positive for nausea ( with \"indigestion\"). Negative for abdominal pain and vomiting.        Belching   Genitourinary: Positive for dysuria (2/2 atrophy).   Musculoskeletal: Negative for joint pain.   Skin: Negative for itching and rash. "   Neurological: Positive for tremors. Negative for weakness.   Endo/Heme/Allergies: Negative for polydipsia.   Psychiatric/Behavioral: Negative for depression. The patient is nervous/anxious and has insomnia (frequent awkenings).        Past Medical History   has a past medical history of Arthritis; Back pain; Bleeding from the nose; Cancer (HCC); Conjunctivitis; COPD (chronic obstructive pulmonary disease) (HCC); COPD (chronic obstructive pulmonary disease) (HCC); Hay fever; Hemorrhoids; Hypothyroid; Kidney disease; Measles; Mumps; Pneumonia; Sinusitis; Urinary tract infection; and Venereal disease.    Surgical History   has a past surgical history that includes knee orif; rectal polypectomy; nasal polypectomy; tubal ligation; tonsillectomy; cervical conization (8/8/08); hysterectomy robotic (11/6/08); and abdominal hysterectomy total.     Family History  family history includes Allergies in her daughter and son; Cancer in her daughter, father, and mother; Diabetes in her son; Heart Disease in her maternal grandfather; Lung Disease in her father and mother; Other in her daughter; Thyroid in her daughter.     Social History   reports that she has been smoking Cigarettes.  She has a 50.00 pack-year smoking history. She has never used smokeless tobacco. She reports that she does not drink alcohol or use drugs.    Allergies  Allergies   Allergen Reactions   • Tape Itching     Adhesive tape   • Augmentin Itching   • Ciprofloxacin Shortness of Breath   • Other Misc      Formaldehyde  Weed killer  Leather glue       Medications  Prior to Admission Medications   Prescriptions Last Dose Informant Patient Reported? Taking?   levothyroxine (LEVOXYL) 88 MCG TABS 4/27/2019 at AM Patient Yes No   Sig: Take 44-88 mcg by mouth every day. SUNDAYS ONLY 44 MCG      Facility-Administered Medications: None       Physical Exam  Temp:  [36.6 °C (97.8 °F)-37.1 °C (98.7 °F)] 36.6 °C (97.8 °F)  Pulse:  [70-96] 70  Resp:  [18] 18  BP:  (146-171)/(63-92) 146/63  SpO2:  [96 %-98 %] 96 %    Physical Exam   Constitutional: She is oriented to person, place, and time. She appears well-developed and well-nourished. No distress.   HENT:   Head: Normocephalic and atraumatic.   Right Ear: External ear normal.   Left Ear: External ear normal.   Mouth/Throat: Oropharynx is clear and moist.   Eyes: Pupils are equal, round, and reactive to light. Conjunctivae and EOM are normal. Right eye exhibits no discharge. Left eye exhibits no discharge. No scleral icterus.   Neck: Neck supple.   No bruits   Cardiovascular: Normal rate and regular rhythm.    Pulmonary/Chest: Effort normal. She exhibits no tenderness.   Moderately decreased breath sounds throughout   Abdominal: Soft. Bowel sounds are normal. She exhibits no distension. There is no tenderness.   Musculoskeletal: She exhibits no edema or tenderness.   Neurological: She is alert and oriented to person, place, and time.   Skin: Skin is warm and dry. She is not diaphoretic.   Psychiatric:   Anxious   Nursing note and vitals reviewed.      Laboratory:  Recent Labs      04/27/19   1308   WBC  9.8   RBC  5.23   HEMOGLOBIN  15.6   HEMATOCRIT  46.9   MCV  89.7   MCH  29.8   MCHC  33.3*   RDW  44.6   PLATELETCT  272   MPV  9.7     Recent Labs      04/27/19   1308   SODIUM  135   POTASSIUM  3.5*   CHLORIDE  99   CO2  25   GLUCOSE  107*   BUN  13   CREATININE  0.82   CALCIUM  9.4     Recent Labs      04/27/19   1308   ALTSGPT  11   ASTSGOT  17   ALKPHOSPHAT  69   TBILIRUBIN  0.9   GLUCOSE  107*                 Recent Labs      04/27/19   1308  04/27/19   1755   TROPONINI  <0.02  <0.02       Urinalysis:    No results found     Imaging:  DX-CHEST-PORTABLE (1 VIEW)   Final Result      Slight hyperinflation. No focal consolidation or pleural effusions.      NM-CARDIAC STRESS TEST    (Results Pending)         Assessment/Plan:  I anticipate this patient is appropriate for observation status at this time.    Hypokalemia    Assessment & Plan    Replace PO     Chest pain   Assessment & Plan    Chest pain is atypical but she is a smoker  Will rule her out with serial enzymes and subsequent stress testing     Tobacco dependence- (present on admission)   Assessment & Plan    Tobacco cessation education provided in light of the fact that she is here with chest pain, and the fact that she has diminished breath sounds and is at risk for progression of COPD and oxygen dependence.  Total time spent 4 minutes  Nicotine replacement ordered     Hashimoto's disease- (present on admission)   Assessment & Plan    Continue levothyroxine         VTE prophylaxis: LOveonx

## 2019-04-28 NOTE — PROGRESS NOTES
Patient presents to NM suite for cardiac stress test with MPI. Nursing goals identified: knowledge deficit, potential for anxiety r/t stress test, potential for compromised cardiac output. Care plan includes educating patient, reassurance and access to ACLS cart/team. Labs and ECG reviewed. No caffeine and NPO confirmed. Resting images attained and patient prepped for pharmacological stress study. Lexiscan given while patient ambulated on TM x 2 mins. Patient reported these symptoms: neck and chest tightness, SOB, tingling, headache. Caffeinated beverage and snack provided. Symptoms resolved.

## 2019-04-28 NOTE — DISCHARGE INSTRUCTIONS
Discharge Instructions    Discharged to home by car with friend. Discharged via walking, hospital escort: Refused.  Special equipment needed: Not Applicable    Be sure to schedule a follow-up appointment with your primary care doctor or any specialists as instructed.     Discharge Plan:   Diet Plan: Discussed  Activity Level: Discussed  Smoking Cessation Offered: Patient Refused  Confirmed Follow up Appointment: Patient to Call and Schedule Appointment  Confirmed Symptoms Management: Discussed  Medication Reconciliation Updated: Yes  Influenza Vaccine Indication: Patient Refuses    I understand that a diet low in cholesterol, fat, and sodium is recommended for good health. Unless I have been given specific instructions below for another diet, I accept this instruction as my diet prescription.   Other diet: regular      Special Instructions: None    · Is patient discharged on Warfarin / Coumadin?   No     Generalized Anxiety Disorder  Generalized anxiety disorder (ALIYAH) is a mental disorder. It interferes with life functions, including relationships, work, and school.  ALIYAH is different from normal anxiety, which everyone experiences at some point in their lives in response to specific life events and activities. Normal anxiety actually helps us prepare for and get through these life events and activities. Normal anxiety goes away after the event or activity is over.   ALIYAH causes anxiety that is not necessarily related to specific events or activities. It also causes excess anxiety in proportion to specific events or activities. The anxiety associated with ALIYAH is also difficult to control. ALIYAH can vary from mild to severe. People with severe ALIYAH can have intense waves of anxiety with physical symptoms (panic attacks).   SYMPTOMS  The anxiety and worry associated with ALIYAH are difficult to control. This anxiety and worry are related to many life events and activities and also occur more days than not for 6 months or  longer. People with ALIYAH also have three or more of the following symptoms (one or more in children):  · Restlessness.    · Fatigue.  · Difficulty concentrating.    · Irritability.  · Muscle tension.  · Difficulty sleeping or unsatisfying sleep.  DIAGNOSIS  ALIYAH is diagnosed through an assessment by your health care provider. Your health care provider will ask you questions about your mood, physical symptoms, and events in your life. Your health care provider may ask you about your medical history and use of alcohol or drugs, including prescription medicines. Your health care provider may also do a physical exam and blood tests. Certain medical conditions and the use of certain substances can cause symptoms similar to those associated with ALIYAH. Your health care provider may refer you to a mental health specialist for further evaluation.  TREATMENT  The following therapies are usually used to treat ALIYAH:   · Medication. Antidepressant medication usually is prescribed for long-term daily control. Antianxiety medicines may be added in severe cases, especially when panic attacks occur.    · Talk therapy (psychotherapy). Certain types of talk therapy can be helpful in treating ALIYAH by providing support, education, and guidance. A form of talk therapy called cognitive behavioral therapy can teach you healthy ways to think about and react to daily life events and activities.  · Stress management techniques. These include yoga, meditation, and exercise and can be very helpful when they are practiced regularly.  A mental health specialist can help determine which treatment is best for you. Some people see improvement with one therapy. However, other people require a combination of therapies.  This information is not intended to replace advice given to you by your health care provider. Make sure you discuss any questions you have with your health care provider.  Document Released: 04/14/2014 Document Revised: 01/08/2016 Document  Reviewed: 04/14/2014  [employer] Patient Education © 2017 [employer]      Depression / Suicide Risk    As you are discharged from this [facility] facility, it is important to learn how to keep safe from harming yourself.    Recognize the warning signs:  · Abrupt changes in personality, positive or negative- including increase in energy   · Giving away possessions  · Change in eating patterns- significant weight changes-  positive or negative  · Change in sleeping patterns- unable to sleep or sleeping all the time   · Unwillingness or inability to communicate  · Depression  · Unusual sadness, discouragement and loneliness  · Talk of wanting to die  · Neglect of personal appearance   · Rebelliousness- reckless behavior  · Withdrawal from people/activities they love  · Confusion- inability to concentrate     If you or a loved one observes any of these behaviors or has concerns about self-harm, here's what you can do:  · Talk about it- your feelings and reasons for harming yourself  · Remove any means that you might use to hurt yourself (examples: pills, rope, extension cords, firearm)  · Get professional help from the community (Mental Health, Substance Abuse, psychological counseling)  · Do not be alone:Call your Safe Contact- someone whom you trust who will be there for you.  · Call your local CRISIS HOTLINE 830-5210 or 905-707-2260  · Call your local Children's Mobile Crisis Response Team [place] (257) 301-2665 or www.Toothpick  · Call the toll free National Suicide Prevention Hotlines   · National Suicide Prevention Lifeline 986-915-EUTO (5224)  · National Hope Line Network 800-SUICIDE (690-4578)    F/U with PCP re: anxiety  Can take hydroxyzine if needed for anxiety until seen by PCP  If GERD continues take daily OTC omeprazole, discuss w PCP GI referral if indicated

## 2019-04-28 NOTE — PROGRESS NOTES
V/S taken, pt resting in bed prior to check calm with unlabored breathing. Denies pain at this time.

## 2019-04-28 NOTE — PROGRESS NOTES
Pt to be d/c'd. D/c paperwork d/w patient. She expressed understanding. IV d/c'd. Monitor removed. Pt down to car with friend.

## 2019-04-28 NOTE — ASSESSMENT & PLAN NOTE
Tobacco cessation education provided in light of the fact that she is here with chest pain, and the fact that she has diminished breath sounds and is at risk for progression of COPD and oxygen dependence.  Total time spent 4 minutes  Nicotine replacement ordered

## 2019-04-29 NOTE — DISCHARGE SUMMARY
Discharge Summary    CHIEF COMPLAINT ON ADMISSION  Chief Complaint   Patient presents with   • Chest Pain   • Anxiety       Reason for Admission  Chest tightness    Admission Date  4/27/2019    CODE STATUS  Full    HPI & HOSPITAL COURSE  This is a 68 y.o. female with a history of tobacco use and COPD, hypothyroidism here with chest tightness.  Her symptoms were related to building stress in her life and sounded very consistent with an anxiety attack.  Due to presence of smoking RF, she was evaluated for cardiac etiology and had negative troponins and EKG.  Stress test was performed and was negative for any evidence of ischemia.  She was relieved to hear about her negative test, but did request ativan.  Non-benzo anti-anxiety medications were discussed with her and she stated she didn't want these meds due to history of side effects in the past.   She will follow this issue up with her PCP.  She had no recurrence of her chest discomfort and was discharged in stable condition.       Therefore, she is discharged in good and stable condition to home with close outpatient follow-up.    The patient recovered much more quickly than anticipated on admission.    Discharge Date  4/28/2019    FOLLOW UP ITEMS POST DISCHARGE  F/U with PCP re: anxiety  Quit smoking    DISCHARGE DIAGNOSES  Active Problems:    Hashimoto's disease POA: Yes    Tobacco dependence POA: Yes    Chest pain POA: Unknown    Hypokalemia POA: Unknown    Pre-diabetes POA: Unknown  Resolved Problems:    * No resolved hospital problems. *      FOLLOW UP  Future Appointments  Date Time Provider Department Center   5/7/2019 3:00 PM CRISTEL Mahajan   5/22/2019 3:00 PM VEELYN Sauceda   6/26/2019 1:45 PM Jesenia Carmichael M.D. [facility] IDRIS Carty M.D.  [address]  [address]  [facility] [id number] [phone]    Call in 1 week  for hospital follow up      MEDICATIONS ON DISCHARGE      Medication List      START taking these medications      Instructions   hydrOXYzine HCl 10 MG Tabs  Commonly known as:  ATARAX   Take 1 Tab by mouth 3 times a day as needed (Anxiety).  Dose:  10 mg        CONTINUE taking these medications      Instructions   LEVOXYL 88 MCG Tabs  Generic drug:  levothyroxine   Take 44-88 mcg by mouth every day. SUNDAYS ONLY 44 MCG  Dose:  44-88 mcg            Allergies  Allergies   Allergen Reactions   • Tape Itching     Adhesive tape   • Augmentin Itching   • Ciprofloxacin Shortness of Breath   • Other Misc      Formaldehyde  Weed killer  Leather glue       DIET  No orders of the defined types were placed in this encounter.      ACTIVITY  As tolerated.  Weight bearing as tolerated    CONSULTATIONS  None    PROCEDURES  None    LABORATORY  Lab Results   Component Value Date    SODIUM 135 04/27/2019    POTASSIUM 3.5 (L) 04/27/2019    CHLORIDE 99 04/27/2019    CO2 25 04/27/2019    GLUCOSE 107 (H) 04/27/2019    BUN 13 04/27/2019    CREATININE 0.82 04/27/2019    CREATININE 0.81 05/10/2010    GLOMRATE >59 05/10/2010        Lab Results   Component Value Date    WBC 9.8 04/27/2019    HEMOGLOBIN 15.6 04/27/2019    HEMATOCRIT 46.9 04/27/2019    PLATELETCT 272 04/27/2019        Total time of the discharge process exceeds 36 minutes.

## 2019-05-03 ENCOUNTER — TELEPHONE (OUTPATIENT)
Dept: MEDICAL GROUP | Facility: MEDICAL CENTER | Age: 69
End: 2019-05-03

## 2019-05-07 ENCOUNTER — HOSPITAL ENCOUNTER (OUTPATIENT)
Facility: MEDICAL CENTER | Age: 69
End: 2019-05-07
Attending: INTERNAL MEDICINE
Payer: MEDICARE

## 2019-05-07 ENCOUNTER — OFFICE VISIT (OUTPATIENT)
Dept: MEDICAL GROUP | Facility: MEDICAL CENTER | Age: 69
End: 2019-05-07
Payer: MEDICARE

## 2019-05-07 VITALS
SYSTOLIC BLOOD PRESSURE: 128 MMHG | TEMPERATURE: 98.3 F | BODY MASS INDEX: 22.49 KG/M2 | OXYGEN SATURATION: 93 % | HEART RATE: 93 BPM | WEIGHT: 135 LBS | DIASTOLIC BLOOD PRESSURE: 80 MMHG | HEIGHT: 65 IN

## 2019-05-07 DIAGNOSIS — R73.03 PRE-DIABETES: ICD-10-CM

## 2019-05-07 DIAGNOSIS — F17.200 TOBACCO DEPENDENCE: ICD-10-CM

## 2019-05-07 DIAGNOSIS — Z00.00 PREVENTATIVE HEALTH CARE: ICD-10-CM

## 2019-05-07 DIAGNOSIS — E87.6 HYPOKALEMIA: ICD-10-CM

## 2019-05-07 DIAGNOSIS — F41.9 ANXIETY: ICD-10-CM

## 2019-05-07 PROBLEM — R07.9 CHEST PAIN: Status: RESOLVED | Noted: 2019-04-27 | Resolved: 2019-05-07

## 2019-05-07 PROCEDURE — 99214 OFFICE O/P EST MOD 30 MIN: CPT | Performed by: INTERNAL MEDICINE

## 2019-05-07 RX ORDER — LORAZEPAM 0.5 MG/1
0.5 TABLET ORAL EVERY 4 HOURS PRN
Qty: 10 TAB | Refills: 0 | Status: SHIPPED | OUTPATIENT
Start: 2019-05-07 | End: 2019-05-07

## 2019-05-07 RX ORDER — LORAZEPAM 0.5 MG/1
0.5 TABLET ORAL
Qty: 10 TAB | Refills: 0 | Status: SHIPPED | OUTPATIENT
Start: 2019-05-07 | End: 2019-11-03

## 2019-05-07 NOTE — ASSESSMENT & PLAN NOTE
We discussed her hemoglobin A1c of 6.3 on 4/27/2019.  Patient is aware to watch diet, maintain regular exercise.  She states when she has more stress she tends not to eat much.

## 2019-05-07 NOTE — LETTER
[facility]  Danika Carty M.D.  [address]  [place] [address]  Fax: [phone]   Authorization for Release/Disclosure of   Protected Health Information   Name: GUERDA BERNAL : 1950 SSN: [id number]   Address: [id number]  [place] [address] Phone:    [phone] (home)    I authorize the entity listed below to release/disclose the PHI below to:   [facility]/Danika Carty M.D. and Danika Carty M.D.   Provider or Entity Name:     Address   City, State, Zip   Phone:      Fax:     Reason for request: continuity of care   Information to be released:    [  ] LAST COLONOSCOPY,  including any PATH REPORT and follow-up  [  ] LAST FIT/COLOGUARD RESULT [  ] LAST DEXA  [  ] LAST MAMMOGRAM  [  ] LAST PAP  [  ] LAST LABS [  ] RETINA EXAM REPORT  [  ] IMMUNIZATION RECORDS  [  ] Release all info      [  ] Check here and initial the line next to each item to release ALL health information INCLUDING  _____ Care and treatment for drug and / or alcohol abuse  _____ HIV testing, infection status, or AIDS  _____ Genetic Testing    DATES OF SERVICE OR TIME PERIOD TO BE DISCLOSED: _____________  I understand and acknowledge that:  * This Authorization may be revoked at any time by you in writing, except if your health information has already been used or disclosed.  * Your health information that will be used or disclosed as a result of you signing this authorization could be re-disclosed by the recipient. If this occurs, your re-disclosed health information may no longer be protected by State or Federal laws.  * You may refuse to sign this Authorization. Your refusal will not affect your ability to obtain treatment.  * This Authorization becomes effective upon signing and will  on (date) __________.      If no date is indicated, this Authorization will  one (1) year from the signature date.    Name: Guerda Bernal    Signature:   Date:     2019            PLEASE FAX REQUESTED RECORDS BACK TO: [phone]

## 2019-05-07 NOTE — ASSESSMENT & PLAN NOTE
Says her smoking habit has not changed, she is not ready to quit because of stress in her life.  Denies any pulmonary symptoms.

## 2019-05-07 NOTE — PROGRESS NOTES
CC:  Diagnoses of Hypokalemia, Anxiety, Preventative health care, Pre-diabetes, and Tobacco dependence were pertinent to this visit.    HISTORY OF THE PRESENT ILLNESS: Patient is a 68 y.o. female. This pleasant patient is here today to f/u.    Anxiety  She indicates she has had anxiety lifelong.  States she has had tried Elavil, Wellbutrin in the past and experienced a sensation similar to brain swelling.  States that she had Atarax in the hospital and just made her feel dizzy.  Most recently had more stress in her life, she moved here last Fall to a senior, active living community that was affordable.  However she says that her neighbors are all very elderly, with many medical conditions and seem to be dependent on her for care.  The day that they notified the community that they would have to sign new lease, maybe increase rent, there was a mob of people that caused severe anxiety and chest pain for Guerda.  She was admitted, negative stress test, negative cardiac enzymes, symptoms thought to be all stress related.  She requests Ativan which worked well in the emergency room.  She says that she could use 10 pills over the next 6 months to avoid severe stress that would cause chest pain and another emergency room visit.  She never drinks any alcohol.  She is not interested in seeing a counselor.  No other drug use.    Hypokalemia  Noted during hospitalization, pt willing to recheck lab.    Pre-diabetes  We discussed her hemoglobin A1c of 6.3 on 4/27/2019.  Patient is aware to watch diet, maintain regular exercise.  She states when she has more stress she tends not to eat much.    Tobacco dependence  Says her smoking habit has not changed, she is not ready to quit because of stress in her life.  Denies any pulmonary symptoms.    Preventative health care  Indicates she did [facility] the beginning of April had 6 polyps and is due again in 3 years.  We will request this record today.   She declines mammogram for preventative health.    Allergies: Tape; Augmentin; Ciprofloxacin; and Other misc    Current Outpatient Prescriptions Ordered in [facility]   Medication Sig Dispense Refill   • LORazepam (ATIVAN) 0.5 MG Tab Take 1 Tab by mouth 1 time daily as needed for up to 180 days. 10 Tab 0   • levothyroxine (LEVOXYL) 88 MCG TABS Take 44-88 mcg by mouth every day. SUNDAYS ONLY 44 MCG       No current [facility]-ordered facility-administered medications on file.        Past Medical History:   Diagnosis Date   • Anxiety    • Arthritis    • Back pain    • Bleeding from the nose    • Cancer (HCC)     hpv cervical   • Conjunctivitis    • COPD (chronic obstructive pulmonary disease) (HCC)    • COPD (chronic obstructive pulmonary disease) (HCC)    • Hay fever    • Hemorrhoids    • Hypothyroid     Hashimoto's   • Kidney disease    • Measles    • Mumps    • Pneumonia    • Sinusitis    • Urinary tract infection    • Venereal disease        Past Surgical History:   Procedure Laterality Date   • HYSTERECTOMY ROBOTIC  11/6/08    Performed by MARIANELA LAZO at SURGERY [facility] ORS   • CERVICAL CONIZATION  8/8/08    Performed by SEKOU PEDERSEN at SURGERY SAME DAY [facility] ORS   • ABDOMINAL HYSTERECTOMY TOTAL      2008   • KNEE ORIF      right   • NASAL POLYPECTOMY     • RECTAL POLYPECTOMY     • TONSILLECTOMY     • TUBAL LIGATION         Social History   Substance Use Topics   • Smoking status: Current Every Day Smoker     Packs/day: 1.00     Years: 50.00     Types: Cigarettes   • Smokeless tobacco: Never Used   • Alcohol use No       Social History     Social History Narrative   • No narrative on file       Family History   Problem Relation Age of Onset   • Cancer Mother         lung   • Lung Disease Mother    • Cancer Father         lung   • Lung Disease Father    • Heart Disease Maternal Grandfather    • Other Daughter         gout   • Thyroid Daughter    • Allergies Daughter    • Cancer Daughter         thyroid   •  "Diabetes Son    • Allergies Son        ROS:     - Constitutional: Negative for fever, chills    - Eyes:   Negative for blurry vision, eye pain, discharge    - ENT:  Negative for hearing changes, ear pain, ear discharge, rhinorrhea, sinus congestion, sore throat     - Respiratory: Negative for cough, sputum production, chest congestion, dyspnea, wheezing, and crackles.      - Cardiovascular: Negative for chest pain, palpitations, orthopnea, and bilateral lower extremity edema.     - Gastrointestinal: Negative for heartburn, nausea, vomiting, abdominal pain, hematochezia, melena, diarrhea, constipation, and greasy/foul-smelling stools.     - Genitourinary: Negative for dysuria      - Musculoskeletal: Having some more joint pains throughout her body without any associated redness/swelling/fever, etc.  - Skin: Vaginal itching unchanged she will follow-up with her gynecologist    - Neurological: Negative for vertigo    - Endo:Negative for polyuria, heat/cold intolerance, excessive thirst    - Hem/lymphatic: Negative for easy bruising, blood clots, lymphedema, swollen glands    -Allergic/immun: Negative for allergic rhinitis    - Psychiatric/Behavioral: See HPI.  No SI/HI    Exam: /80 (BP Location: Right arm, Patient Position: Sitting, BP Cuff Size: Adult)   Pulse 93   Temp 36.8 °C (98.3 °F) (Temporal)   Ht 1.651 m (5' 5\")   Wt 61.2 kg (135 lb)   SpO2 93%  Body mass index is 22.47 kg/m².    General: Normal appearing. No distress.  EYES: Conjunctiva clear lids without ptosis, pupils equal  EARS: Normal shape and contour   Pulmonary: Clear to ausculation.  Normal effort. No rales or wheezing.  Cardiovascular: Regular rate and rhythm without significant murmur.   Abdomen: Soft, nontender, nondistended. Normal bowel sounds.  Neurologic: Cranial nerves grossly nonfocal  Skin: Warm and dry.  No obvious lesions.  Musculoskeletal: Normal gait. No extremity cyanosis, clubbing, or edema.  Psych: Normal mood and affect. " Alert and oriented x3. Judgment and insight is normal.        Assessment/Plan  1. Hypokalemia  Suspect resolved now back on her normal diet.  - Basic Metabolic Panel; Future    2. Anxiety  She has tried many different non-controlled substance medications in the past without benefit.  I believe it is reasonable to give her 10 pills over the next 6 months to help with her occasional acute anxiety attacks related to her living environment, and her upcoming move to [place] this November.  She indicates she never drinks any alcohol, and she will not mix drugs with this medication.     Controlled substance agreement will be signed today.  - Controlled Substance Treatment Agreement  - Pain Management Screen; Future  - LORazepam (ATIVAN) 0.5 MG Tab; Take 1 Tab by mouth 1 time daily as needed for up to 180 days.  Dispense: 10 Tab; Refill: 0    3. Preventative health care  - Basic Metabolic Panel; Future  - VITAMIN D,25 HYDROXY; Future  - VITAMIN B12; Future    4. Pre-diabetes  Diet and exercise encouraged    5. Tobacco dependence  Reassess at follow-up appointment, patient not ready to quit today      Return to clinic 3 months or sooner if needed      Please note that this dictation was created using voice recognition software. I have made every reasonable attempt to correct obvious errors, but I expect that there are errors of grammar and possibly content that I did not discover before finalizing the note.

## 2019-05-07 NOTE — ASSESSMENT & PLAN NOTE
She indicates she has had anxiety lifelong.  States she has had tried Elavil, Wellbutrin in the past and experienced a sensation similar to brain swelling.  States that she had Atarax in the hospital and just made her feel dizzy.  Most recently had more stress in her life, she moved here last Fall to a senior, active living community that was affordable.  However she says that her neighbors are all very elderly, with many medical conditions and seem to be dependent on her for care.  The day that they notified the community that they would have to sign new lease, maybe increase rent, there was a mob of people that caused severe anxiety and chest pain for Guerda.  She was admitted, negative stress test, negative cardiac enzymes, symptoms thought to be all stress related.  She requests Ativan which worked well in the emergency room.  She says that she could use 10 pills over the next 6 months to avoid severe stress that would cause chest pain and another emergency room visit.  She never drinks any alcohol.  She is not interested in seeing a counselor.  No other drug use.

## 2019-05-07 NOTE — ASSESSMENT & PLAN NOTE
Indicates she did have a [facility] around the beginning of April had 6 polyps and is due again in 3 years.  We will request this record today.  She declines mammogram for preventative health.

## 2019-05-09 DIAGNOSIS — F41.9 ANXIETY: ICD-10-CM

## 2019-05-22 ENCOUNTER — OFFICE VISIT (OUTPATIENT)
Dept: ENDOCRINOLOGY | Facility: MEDICAL CENTER | Age: 69
End: 2019-05-22
Payer: MEDICARE

## 2019-05-22 ENCOUNTER — TELEPHONE (OUTPATIENT)
Dept: ENDOCRINOLOGY | Facility: MEDICAL CENTER | Age: 69
End: 2019-05-22

## 2019-05-22 VITALS
HEART RATE: 107 BPM | DIASTOLIC BLOOD PRESSURE: 72 MMHG | RESPIRATION RATE: 15 BRPM | BODY MASS INDEX: 22.49 KG/M2 | SYSTOLIC BLOOD PRESSURE: 132 MMHG | HEIGHT: 65 IN | WEIGHT: 135 LBS | OXYGEN SATURATION: 94 %

## 2019-05-22 DIAGNOSIS — F41.9 ANXIETY: ICD-10-CM

## 2019-05-22 DIAGNOSIS — E04.2 MULTIPLE THYROID NODULES: ICD-10-CM

## 2019-05-22 DIAGNOSIS — E06.3 HYPOTHYROIDISM DUE TO HASHIMOTO'S THYROIDITIS: ICD-10-CM

## 2019-05-22 DIAGNOSIS — R73.03 PRE-DIABETES: ICD-10-CM

## 2019-05-22 DIAGNOSIS — E03.8 HYPOTHYROIDISM DUE TO HASHIMOTO'S THYROIDITIS: ICD-10-CM

## 2019-05-22 DIAGNOSIS — R53.83 FATIGUE, UNSPECIFIED TYPE: ICD-10-CM

## 2019-05-22 PROCEDURE — 99204 OFFICE O/P NEW MOD 45 MIN: CPT | Performed by: PHYSICIAN ASSISTANT

## 2019-05-22 NOTE — PROGRESS NOTES
"New Patient Consult Note  Referred by: Danika Carty M.D.    HPI:  Guerda Boateng is a  68 y.o. old patient who comes in today for evaluation and management of the following:      Patient was previously with Dr. Crain 2010 secondary to moving to [place]   Followed by PCP and Endocrinologist in 2015 with US.   Patient is planning on moving back 12/2019    1. Hypothyroidism due to Hashimoto's thyroiditis  Dx 2006- Patient has been on Levoxyl since diagnosis.     Levoxyl 88 mcg daily 6 days and 1/2 day #7 - changed dose 12/2017     Patient has been under a lot of stress secondary to living situation.     4/25/2019- TSH 0.778   12/2017- TSh 0.25, Ft4 1.83 (H)   5/2015- TSh 1.2 , FT4 1.50 FT3 2.7     2. Multiple thyroid nodules  Previous US with Dr. Crain with [facility] did US for surveillance     Patient symptoms of sensation of a throat mass \"once in a while.\" \"depending on what she is eating\" usually occurring once in a while. neck pain left side, extreme intolerance to hot and cold.     Denies: voice changes, hoarseness (same voice for last 15 years w/o change),  or  dysphonia, cough, enlarged cervical lymph nodes, sweating, tremors, weight loss/gain.     Patient denies any history of child head or neck radiation (before the age of 18) a family history of thyroid cancer.     Daughter with with thyroid cancer   Son with hashimotos     5/2015- The right thyroid lobe measures 3.2 x 1.4x 1 cm. The left thyroid lobe meaures 3x 0.8x 0.8 cm. The isthmus meausres 2 mm. Increased blood flow is present bilaterally. The thyroid gland is herterogeneious in echotrexture. Thees is a 12 mm Hypoechoic nodule in the superior left thyroid lobe.     3. Pre-diabetes  Patient over the last several   Son with DM     Patient does not exercise.   Patient does not keep to any real diet.   Denies polyuria polydipsia    4/27/2019- 107, eGFR >60, A1c 6.3   12/2017-Glucose 101  12/2015- Glucose 95A1c 5.6     4. Dyslipidemia: "   4/28/2019- , , HDL 53,  -   5/2015- , , HDL 67,  -   Patient started eating oatmeal and salad more often     ROS:  Constitutional: No weight loss or gain,  fever  HEENT: No difficulty with swallowing, change in voice, or swelling in throat area   Cardiac: No chest pain, palpitations, or racing heart  Resp: No shortness of breath  GI: No abdominal pain, nausea, vomiting, or diarrhea   Neuro: No numbness or tinging in feet  Endo: No heat or cold intolerance, no polyuria or polydipsia  All other systems were reviewed and were negative.    Past Medical History:  Patient Active Problem List    Diagnosis Date Noted   • Hypothyroidism due to Hashimoto's thyroiditis 05/22/2019   • Multiple thyroid nodules 05/22/2019   • Anxiety 05/07/2019   • Preventative health care 05/07/2019   • Pre-diabetes 04/28/2019   • Hypokalemia 04/27/2019   • Hashimoto's disease 01/23/2019   • Tobacco dependence 01/23/2019   • Vaginal itching 01/23/2019   • COPD (chronic obstructive pulmonary disease) (HCC) 01/23/2019       Past Surgical History:  Past Surgical History:   Procedure Laterality Date   • HYSTERECTOMY ROBOTIC  11/6/08    Performed by MARIANELA LAZO at SURGERY [facility] ORS   • CERVICAL CONIZATION  8/8/08    Performed by SEKOU PEDERSEN at SURGERY SAME DAY [facility] ORS   • ABDOMINAL HYSTERECTOMY TOTAL      2008   • KNEE ORIF      right   • NASAL POLYPECTOMY     • RECTAL POLYPECTOMY     • TONSILLECTOMY     • TUBAL LIGATION         Allergies:  Tape; Augmentin; Ciprofloxacin; and Other misc    Social History:  Social History     Social History   • Marital status:      Spouse name: N/A   • Number of children: N/A   • Years of education: N/A     Occupational History   • Not on file.     Social History Main Topics   • Smoking status: Current Every Day Smoker     Packs/day: 1.00     Years: 50.00     Types: Cigarettes   • Smokeless tobacco: Never Used   • Alcohol use No   • Drug use: No   •  "Sexual activity: Not Currently     Partners: Male     Birth control/ protection: Other-See Comments      Comment: none     Other Topics Concern   • Not on file     Social History Narrative   • No narrative on file       Family History:  Family History   Problem Relation Age of Onset   • Cancer Mother         lung   • Lung Disease Mother    • Cancer Father         lung   • Lung Disease Father    • Heart Disease Maternal Grandfather    • Other Daughter         gout   • Thyroid Daughter    • Allergies Daughter    • Cancer Daughter         thyroid   • Diabetes Son    • Allergies Son        Medications:    Current Outpatient Prescriptions:   •  levothyroxine (LEVOXYL) 88 MCG TABS, Take 44-88 mcg by mouth every day. SUNDAYS ONLY 44 MCG, Disp: , Rfl:   •  LORazepam (ATIVAN) 0.5 MG Tab, Take 1 Tab by mouth 1 time daily as needed for up to 180 days., Disp: 10 Tab, Rfl: 0    Labs: Reviewed (as above)     Physical Examination:  Vital signs: /72 (BP Location: Right arm, Patient Position: Sitting, BP Cuff Size: Adult)   Pulse (!) 107   Resp 15   Ht 1.651 m (5' 5\")   Wt 61.2 kg (135 lb)   LMP  (LMP Unknown)   SpO2 94%   BMI 22.47 kg/m²  Body mass index is 22.47 kg/m².  General: No apparent distress, cooperative, no facial hair    Eyes: No scleral icterus or discharge, no nystagmus  ENMT: Normal on external inspection of nose, lips, normal thyroid exam  Neck: No abnormal masses on inspection or palpations. no bruits noted   Resp: Normal effort, clear to auscultation bilaterally without wheezes, rales or rhonchi  CVS: Regular rate and rhythm, S1 S2 normal, no murmur, rubs or gallops    Extremities: No edema.   Abdomen: abdominal obesity present, No Hepatosplenomegaly, no striae   Neuro: Alert and oriented  Skin: No rash  Psych: Normal mood and affect, intact memory and able to make informed decisions    Assessment and Plan:  1. Hypothyroidism due to Hashimoto's thyroiditis  Continue current regimen Levoxyl 88 mcg daily " 6 days and 1/2 day #7   - FREE THYROXINE; Future  - TSH; Future  - T3 FREE; Future  - TRIIDOTHYRONINE; Future  - ACTH; Future  - CORTISOL; Future  - US-SOFT TISSUES OF HEAD - NECK; Future    2. Multiple thyroid nodules  Will check US prior to next evaluation    3. Pre-diabetes  Discussed lifestyle changes.     4. Anxiety  Patient is currently on Lorazepam   - ACTH; Future  - CORTISOL; Future    5. Fatigue, unspecified type  Rule out Vitamin B12 and Vitamin D deficiency as the contributory/etiology for fatigue.    - VITAMIN B12; Future  - VITAMIN D,25 HYDROXY; Future    Return in about 2 months (around 7/22/2019).     Thank you for allowing me to participate in the care of this patient.  If you have any questions or concerns please do not hesitate to contact me.    Mariah Cain P.A.-C.  05/22/19    CC:   Danika Carty M.D.    This note was created using voice recognition software (Dragon). The accuracy of the dictation is limited by the abilities of the software. I have reviewed the note prior to signing, however some errors in grammar and context are still possible. If you have any questions related to this note please do not hesitate to contact our office.

## 2019-05-22 NOTE — TELEPHONE ENCOUNTER
Patient forgot to mention during appointment that she needs a refill on levothyroxine for a 3 month supply. She would like this sent to the [facility] on [facility] and [facility].

## 2019-06-19 ENCOUNTER — HOSPITAL ENCOUNTER (OUTPATIENT)
Dept: LAB | Facility: MEDICAL CENTER | Age: 69
End: 2019-06-19
Attending: INTERNAL MEDICINE
Payer: MEDICARE

## 2019-06-19 ENCOUNTER — HOSPITAL ENCOUNTER (OUTPATIENT)
Dept: LAB | Facility: MEDICAL CENTER | Age: 69
End: 2019-06-19
Attending: PHYSICIAN ASSISTANT
Payer: MEDICARE

## 2019-06-19 ENCOUNTER — HOSPITAL ENCOUNTER (OUTPATIENT)
Dept: RADIOLOGY | Facility: MEDICAL CENTER | Age: 69
End: 2019-06-19
Attending: PHYSICIAN ASSISTANT
Payer: MEDICARE

## 2019-06-19 DIAGNOSIS — F41.9 ANXIETY: ICD-10-CM

## 2019-06-19 DIAGNOSIS — Z00.00 PREVENTATIVE HEALTH CARE: ICD-10-CM

## 2019-06-19 DIAGNOSIS — E03.8 HYPOTHYROIDISM DUE TO HASHIMOTO'S THYROIDITIS: ICD-10-CM

## 2019-06-19 DIAGNOSIS — R73.03 PRE-DIABETES: ICD-10-CM

## 2019-06-19 DIAGNOSIS — E87.6 HYPOKALEMIA: ICD-10-CM

## 2019-06-19 DIAGNOSIS — E06.3 HYPOTHYROIDISM DUE TO HASHIMOTO'S THYROIDITIS: ICD-10-CM

## 2019-06-19 DIAGNOSIS — R53.83 FATIGUE, UNSPECIFIED TYPE: ICD-10-CM

## 2019-06-19 DIAGNOSIS — E06.3 HASHIMOTO'S DISEASE: ICD-10-CM

## 2019-06-19 LAB
25(OH)D3 SERPL-MCNC: 16 NG/ML (ref 30–100)
ALBUMIN SERPL BCP-MCNC: 4.2 G/DL (ref 3.2–4.9)
ALBUMIN/GLOB SERPL: 1.8 G/DL
ALP SERPL-CCNC: 64 U/L (ref 30–99)
ALT SERPL-CCNC: 11 U/L (ref 2–50)
ANION GAP SERPL CALC-SCNC: 7 MMOL/L (ref 0–11.9)
AST SERPL-CCNC: 14 U/L (ref 12–45)
BASOPHILS # BLD AUTO: 1.2 % (ref 0–1.8)
BASOPHILS # BLD: 0.09 K/UL (ref 0–0.12)
BILIRUB SERPL-MCNC: 0.6 MG/DL (ref 0.1–1.5)
BUN SERPL-MCNC: 18 MG/DL (ref 8–22)
CALCIUM SERPL-MCNC: 9.4 MG/DL (ref 8.5–10.5)
CHLORIDE SERPL-SCNC: 108 MMOL/L (ref 96–112)
CHOLEST SERPL-MCNC: 204 MG/DL (ref 100–199)
CO2 SERPL-SCNC: 27 MMOL/L (ref 20–33)
CORTIS SERPL-MCNC: 10.8 UG/DL (ref 0–23)
CREAT SERPL-MCNC: 0.83 MG/DL (ref 0.5–1.4)
EOSINOPHIL # BLD AUTO: 0.28 K/UL (ref 0–0.51)
EOSINOPHIL NFR BLD: 3.8 % (ref 0–6.9)
ERYTHROCYTE [DISTWIDTH] IN BLOOD BY AUTOMATED COUNT: 45.5 FL (ref 35.9–50)
FASTING STATUS PATIENT QL REPORTED: NORMAL
GLOBULIN SER CALC-MCNC: 2.3 G/DL (ref 1.9–3.5)
GLUCOSE SERPL-MCNC: 126 MG/DL (ref 65–99)
HCT VFR BLD AUTO: 44.8 % (ref 37–47)
HDLC SERPL-MCNC: 57 MG/DL
HGB BLD-MCNC: 14.7 G/DL (ref 12–16)
IMM GRANULOCYTES # BLD AUTO: 0.02 K/UL (ref 0–0.11)
IMM GRANULOCYTES NFR BLD AUTO: 0.3 % (ref 0–0.9)
LDLC SERPL CALC-MCNC: 124 MG/DL
LYMPHOCYTES # BLD AUTO: 1.78 K/UL (ref 1–4.8)
LYMPHOCYTES NFR BLD: 24.1 % (ref 22–41)
MCH RBC QN AUTO: 30.5 PG (ref 27–33)
MCHC RBC AUTO-ENTMCNC: 32.8 G/DL (ref 33.6–35)
MCV RBC AUTO: 92.9 FL (ref 81.4–97.8)
MONOCYTES # BLD AUTO: 0.69 K/UL (ref 0–0.85)
MONOCYTES NFR BLD AUTO: 9.3 % (ref 0–13.4)
NEUTROPHILS # BLD AUTO: 4.53 K/UL (ref 2–7.15)
NEUTROPHILS NFR BLD: 61.3 % (ref 44–72)
NRBC # BLD AUTO: 0 K/UL
NRBC BLD-RTO: 0 /100 WBC
PLATELET # BLD AUTO: 256 K/UL (ref 164–446)
PMV BLD AUTO: 10.4 FL (ref 9–12.9)
POTASSIUM SERPL-SCNC: 4.2 MMOL/L (ref 3.6–5.5)
PROT SERPL-MCNC: 6.5 G/DL (ref 6–8.2)
RBC # BLD AUTO: 4.82 M/UL (ref 4.2–5.4)
SODIUM SERPL-SCNC: 142 MMOL/L (ref 135–145)
T3 SERPL-MCNC: 82.1 NG/DL (ref 60–181)
T3FREE SERPL-MCNC: 2.94 PG/ML (ref 2.4–4.2)
T4 FREE SERPL-MCNC: 1.27 NG/DL (ref 0.53–1.43)
TRIGL SERPL-MCNC: 113 MG/DL (ref 0–149)
TSH SERPL DL<=0.005 MIU/L-ACNC: 1.04 UIU/ML (ref 0.38–5.33)
VIT B12 SERPL-MCNC: 137 PG/ML (ref 211–911)
WBC # BLD AUTO: 7.4 K/UL (ref 4.8–10.8)

## 2019-06-19 PROCEDURE — 84481 FREE ASSAY (FT-3): CPT

## 2019-06-19 PROCEDURE — 80061 LIPID PANEL: CPT

## 2019-06-19 PROCEDURE — 85025 COMPLETE CBC W/AUTO DIFF WBC: CPT

## 2019-06-19 PROCEDURE — 84443 ASSAY THYROID STIM HORMONE: CPT

## 2019-06-19 PROCEDURE — 76536 US EXAM OF HEAD AND NECK: CPT

## 2019-06-19 PROCEDURE — 82533 TOTAL CORTISOL: CPT

## 2019-06-19 PROCEDURE — 84439 ASSAY OF FREE THYROXINE: CPT

## 2019-06-19 PROCEDURE — 36415 COLL VENOUS BLD VENIPUNCTURE: CPT

## 2019-06-19 PROCEDURE — 82024 ASSAY OF ACTH: CPT

## 2019-06-19 PROCEDURE — 84480 ASSAY TRIIODOTHYRONINE (T3): CPT

## 2019-06-19 PROCEDURE — 80053 COMPREHEN METABOLIC PANEL: CPT

## 2019-06-19 PROCEDURE — 82306 VITAMIN D 25 HYDROXY: CPT | Mod: GA

## 2019-06-19 PROCEDURE — 82607 VITAMIN B-12: CPT

## 2019-06-20 ENCOUNTER — TELEPHONE (OUTPATIENT)
Dept: MEDICAL GROUP | Facility: MEDICAL CENTER | Age: 69
End: 2019-06-20

## 2019-06-20 NOTE — TELEPHONE ENCOUNTER
----- Message from Danika Carty M.D. sent at 6/20/2019  7:27 AM PDT -----  Please kindly instruct patient blood counts normal, cholesterol stable, glucose elevated.  Potassium is back to normal.  She should try to walk every day at least 30 minutes, and other exercise and watch her diet as she may be developing diabetes with the elevated glucose.

## 2019-06-20 NOTE — TELEPHONE ENCOUNTER
1. Caller Name: Guerda Boateng      Call Back Number: [phone] (home)         Patient approves a detailed voicemail message: N\A    Called and LVM stating we recieved your lab results we need a call back to release them.

## 2019-06-21 LAB — ACTH PLAS-MCNC: 15 PG/ML (ref 6–58)

## 2019-07-22 ENCOUNTER — OFFICE VISIT (OUTPATIENT)
Dept: ENDOCRINOLOGY | Facility: MEDICAL CENTER | Age: 69
End: 2019-07-22
Payer: MEDICARE

## 2019-07-22 VITALS
BODY MASS INDEX: 22.99 KG/M2 | HEIGHT: 65 IN | SYSTOLIC BLOOD PRESSURE: 100 MMHG | WEIGHT: 138 LBS | OXYGEN SATURATION: 96 % | DIASTOLIC BLOOD PRESSURE: 60 MMHG | HEART RATE: 110 BPM

## 2019-07-22 DIAGNOSIS — R73.03 PRE-DIABETES: ICD-10-CM

## 2019-07-22 DIAGNOSIS — E03.8 HYPOTHYROIDISM DUE TO HASHIMOTO'S THYROIDITIS: ICD-10-CM

## 2019-07-22 DIAGNOSIS — E55.9 VITAMIN D DEFICIENCY: ICD-10-CM

## 2019-07-22 DIAGNOSIS — E06.3 HYPOTHYROIDISM DUE TO HASHIMOTO'S THYROIDITIS: ICD-10-CM

## 2019-07-22 DIAGNOSIS — E04.2 MULTIPLE THYROID NODULES: ICD-10-CM

## 2019-07-22 DIAGNOSIS — E78.5 DYSLIPIDEMIA: ICD-10-CM

## 2019-07-22 PROCEDURE — 99214 OFFICE O/P EST MOD 30 MIN: CPT | Performed by: PHYSICIAN ASSISTANT

## 2019-07-22 RX ORDER — LEVOTHYROXINE SODIUM 88 UG/1
88 TABLET ORAL
Qty: 90 TAB | Refills: 3 | Status: SHIPPED | OUTPATIENT
Start: 2019-07-22 | End: 2019-10-10 | Stop reason: SDUPTHER

## 2019-07-22 RX ORDER — ERGOCALCIFEROL 1.25 MG/1
50000 CAPSULE ORAL
Qty: 4 CAP | Refills: 2 | Status: SHIPPED | OUTPATIENT
Start: 2019-07-22 | End: 2020-05-18

## 2019-07-22 NOTE — PROGRESS NOTES
New Patient Consult Note  Referred by: Danika Carty M.D.    HPI:  Guerda Boateng is a  68 y.o. old patient who comes in today for evaluation and management of the following:      Patient is moving at the end of November 1. Hypothyroidism due to Hashimoto's thyroiditis  Continue current regimen Levoxyl 88 mcg daily 6 days and 1/2 day #7     6/19/2019 TSH 1.040, free T4 1.27, T3 82.1, free T3 2.94,    2. Multiple thyroid nodules  Denies: voice changes, hoarseness (same voice for last 15 years w/o change),  or  dysphonia, cough, enlarged cervical lymph nodes, sweating, tremors.      Patient denies any history of child head or neck radiation (before the age of 18) a family history of thyroid cancer.     Daughter with with thyroid cancer   Son with hashimotos     Thyroid ultrasound 6/19/2019:   Nodules >= 1cm:    Nodule #1  Location:  Right  upper  Size:  1.0 x .79 x .61 cm  Composition:  Solid-2  Echogenicity:  Hyperechoic-1  Shape:  Wider than tall-0  Margins:  Smooth-0  Echogenic Foci:  None-0      3. Pre-diabetes  Patient does not exercise.   Patient does not keep to any real diet. Patient does not eat sweets. Patient has some dental issue   Denies polyuria polydipsia, UTI, candidiasis     6/19/2019 glucose 126      4. Anxiety  Has rx for lorazepam but has not used it.      5.  Vitamin D deficiency  6/19/2019 vitamin D 16-   Patient takes Calicum/Osteo Biflex     6. Dyslipidemia:   6/19/2019 total cholesterol 204, triglycerides 113, HDL 57, -   4/28/2019- , , HDL 53,  -   5/2015- , , HDL 67,  -     7.  B12 deficiency-new to me identified labs from 6/19/2019 6/19/2018 vitamin B12 137      Endocrinology history to date:  Patient was previously with Dr. Crain 2010 secondary to moving to [place]   Followed by PCP and Endocrinologist in 2015 with US.   Patient is planning on moving back 12/2019    1. Hypothyroidism due to Hashimoto's thyroiditis  Dx 2006- Patient  has been on Levoxyl since diagnosis.     4/25/2019- TSH 0.778   12/2017- TSh 0.25, Ft4 1.83 (H)   5/2015- TSh 1.2 , FT4 1.50 FT3 2.7     2. Multiple thyroid nodules  Previous US with Dr. Crain with Bx   [place] did US for surveillance     5/2015- The right thyroid lobe measures 3.2 x 1.4x 1 cm. The left thyroid lobe meaures 3x 0.8x 0.8 cm. The isthmus meausres 2 mm. Increased blood flow is present bilaterally. The thyroid gland is herterogeneious in echotrexture. Thees is a 12 mm Hypoechoic nodule in the superior left thyroid lobe.     3. Pre-diabetes  Son with DM     4/27/2019- 107, eGFR >60, A1c 6.3   12/2017-Glucose 101  12/2015- Glucose 95A1c 5.6     4. Dyslipidemia:   4/28/2019- , , HDL 53,  -   5/2015- , , HDL 67,  -   Patient started eating oatmeal and salad more often     ROS:  Constitutional: No weight loss,  Fever, chills   HEENT: No difficulty with swallowing,   Cardiac: No chest pain, palpitations, or racing heart  Resp: No shortness of breath  GI: No abdominal pain, nausea, vomiting, or diarrhea   Neuro: No numbness or tinging in feet + joint pain (patient has been doing a lot of heavy lifting)   Endo: No heat or cold intolerance, no polyuria or polydipsia  All other systems were reviewed and were negative.    Past Medical History:  Patient Active Problem List    Diagnosis Date Noted   • Hypothyroidism due to Hashimoto's thyroiditis 05/22/2019   • Multiple thyroid nodules 05/22/2019   • Anxiety 05/07/2019   • Preventative health care 05/07/2019   • Pre-diabetes 04/28/2019   • Hypokalemia 04/27/2019   • Hashimoto's disease 01/23/2019   • Tobacco dependence 01/23/2019   • Vaginal itching 01/23/2019   • COPD (chronic obstructive pulmonary disease) (HCC) 01/23/2019       Past Surgical History:  Past Surgical History:   Procedure Laterality Date   • HYSTERECTOMY ROBOTIC  11/6/08    Performed by MARIANELA LAZO at SURGERY [facility]   • CERVICAL CONIZATION  8/8/08     "Performed by SEKOU PEDERSEN at SURGERY SAME DAY [facility] ORS   • ABDOMINAL HYSTERECTOMY TOTAL      2008   • KNEE ORIF      right   • NASAL POLYPECTOMY     • RECTAL POLYPECTOMY     • TONSILLECTOMY     • TUBAL LIGATION         Allergies:  Tape; Augmentin; Ciprofloxacin; and Other misc    Social History:  Social History     Social History   • Marital status:      Spouse name: N/A   • Number of children: N/A   • Years of education: N/A     Occupational History   • Not on file.     Social History Main Topics   • Smoking status: Current Every Day Smoker     Packs/day: 1.00     Years: 50.00     Types: Cigarettes   • Smokeless tobacco: Never Used   • Alcohol use No   • Drug use: No   • Sexual activity: Not Currently     Partners: Male     Birth control/ protection: Other-See Comments      Comment: none     Other Topics Concern   • Not on file     Social History Narrative   • No narrative on file       Family History:  Family History   Problem Relation Age of Onset   • Cancer Mother         lung   • Lung Disease Mother    • Cancer Father         lung   • Lung Disease Father    • Heart Disease Maternal Grandfather    • Other Daughter         gout   • Thyroid Daughter    • Allergies Daughter    • Cancer Daughter         thyroid   • Diabetes Son    • Allergies Son        Medications:    Current Outpatient Prescriptions:   •  levothyroxine (SYNTHROID) 88 MCG Tab, Take 1 Tab by mouth Every morning on an empty stomach., Disp: 90 Tab, Rfl: 3  •  vitamin D, Ergocalciferol, (DRISDOL) 83601 units Cap capsule, Take 1 Cap by mouth every 7 days., Disp: 4 Cap, Rfl: 2  •  LORazepam (ATIVAN) 0.5 MG Tab, Take 1 Tab by mouth 1 time daily as needed for up to 180 days., Disp: 10 Tab, Rfl: 0    Labs: Reviewed (as above)     Physical Examination:  Vital signs: /60 (BP Location: Left arm, Patient Position: Sitting, BP Cuff Size: Adult)   Pulse (!) 110   Ht 1.651 m (5' 5\")   Wt 62.6 kg (138 lb)   LMP  (LMP Unknown)   SpO2 96%  "  BMI 22.96 kg/m²  Body mass index is 22.96 kg/m².  General: No apparent distress, cooperative, no facial hair    Eyes: No scleral icterus or discharge, no nystagmus  ENMT: Normal on external inspection of nose, lips, normal thyroid exam  Neck: No abnormal masses on inspection or palpations. no bruits noted   Resp: Normal effort, clear to auscultation bilaterally without wheezes, rales or rhonchi  CVS: Regular rate and rhythm, S1 S2 normal, no murmur, rubs or gallops    Neuro: Alert and oriented  Skin: No rash  Psych: Normal mood and affect, intact memory and able to make informed decisions    Assessment and Plan:  1. Hypothyroidism due to Hashimoto's thyroiditis  Continue current regimen Levoxyl 88 mcg daily 6 days and 1/2 day #7   rx called in     2. Multiple thyroid nodules  Reviewed US with patient, Discussed would recommend routine surveillance     3. Pre-diabetes  Continue diet/lifestyle changes.   Encouraged avoid white sugars/flour   30-40 minutes of movement a day     4. Anxiety  Patient is currently on Lorazepam   Uses prn     5.  Vitamin D deficiency  Will rx Vitamin 50 K /week for 2 months then start 1202-8621 IU daily with food     6/19/2019 vitamin D 16-   Patient takes Calicum/Osteo Biflex     6. Dyslipidemia:   6/19/2019 total cholesterol 204, triglycerides 113, HDL 57, -   4/28/2019- , , HDL 53,  -   5/2015- , , HDL 67,  -     7.  B12 deficiency-new to me identified labs from 6/19/2019  Patient would like to take OTC 2500 micrograms daily     6/19/2018 vitamin B12 137    Return if symptoms worsen or fail to improve.  Patient moving in November     Thank you for allowing me to participate in the care of this patient.  If you have any questions or concerns please do not hesitate to contact me.    Mariah Cain P.A.-C.  05/22/19    CC:   Danika Carty M.D.    This note was created using voice recognition software (Dragon). The accuracy of the  dictation is limited by the abilities of the software. I have reviewed the note prior to signing, however some errors in grammar and context are still possible. If you have any questions related to this note please do not hesitate to contact our office.

## 2019-07-22 NOTE — PATIENT INSTRUCTIONS
1. Hypothyroidism due to Hashimoto's thyroiditis  Continue current regimen Levoxyl 88 mcg daily 6 days and 1/2 day #7       2. Multiple thyroid nodules  Reviewed US with patient, Discussed would recommend routine surveillance     3. Pre-diabetes  Continue diet/lifestyle changes.   Encouraged avoid white sugars/flour   30-40 minutes of movement a day     4. Anxiety  Patient is currently on Lorazepam   Uses prn     5.  Vitamin D deficiency  Will rx Vitamin 50 K /week for 2 months then start 1216-6699 IU daily with food     6/19/2019 vitamin D 16-   Patient takes Calicum/Osteo Biflex     6. Dyslipidemia:   6/19/2019 total cholesterol 204, triglycerides 113, HDL 57, -   4/28/2019- , , HDL 53,  -   5/2015- , , HDL 67,  -     7.  B12 deficiency-new to me identified labs from 6/19/2019  Patient would like to take OTC 2500 micrograms daily     6/19/2018 vitamin B12 137

## 2019-09-18 ENCOUNTER — APPOINTMENT (RX ONLY)
Dept: URBAN - METROPOLITAN AREA CLINIC 22 | Facility: CLINIC | Age: 69
Setting detail: DERMATOLOGY
End: 2019-09-18

## 2019-09-18 DIAGNOSIS — L82.1 OTHER SEBORRHEIC KERATOSIS: ICD-10-CM

## 2019-09-18 DIAGNOSIS — D22 MELANOCYTIC NEVI: ICD-10-CM

## 2019-09-18 DIAGNOSIS — D18.0 HEMANGIOMA: ICD-10-CM

## 2019-09-18 DIAGNOSIS — Z71.89 OTHER SPECIFIED COUNSELING: ICD-10-CM

## 2019-09-18 DIAGNOSIS — L81.4 OTHER MELANIN HYPERPIGMENTATION: ICD-10-CM

## 2019-09-18 PROBLEM — D22.72 MELANOCYTIC NEVI OF LEFT LOWER LIMB, INCLUDING HIP: Status: ACTIVE | Noted: 2019-09-18

## 2019-09-18 PROBLEM — D18.01 HEMANGIOMA OF SKIN AND SUBCUTANEOUS TISSUE: Status: ACTIVE | Noted: 2019-09-18

## 2019-09-18 PROBLEM — D22.39 MELANOCYTIC NEVI OF OTHER PARTS OF FACE: Status: ACTIVE | Noted: 2019-09-18

## 2019-09-18 PROBLEM — D22.62 MELANOCYTIC NEVI OF LEFT UPPER LIMB, INCLUDING SHOULDER: Status: ACTIVE | Noted: 2019-09-18

## 2019-09-18 PROBLEM — D22.71 MELANOCYTIC NEVI OF RIGHT LOWER LIMB, INCLUDING HIP: Status: ACTIVE | Noted: 2019-09-18

## 2019-09-18 PROBLEM — D22.61 MELANOCYTIC NEVI OF RIGHT UPPER LIMB, INCLUDING SHOULDER: Status: ACTIVE | Noted: 2019-09-18

## 2019-09-18 PROBLEM — D22.5 MELANOCYTIC NEVI OF TRUNK: Status: ACTIVE | Noted: 2019-09-18

## 2019-09-18 PROCEDURE — ? COUNSELING

## 2019-09-18 PROCEDURE — 99203 OFFICE O/P NEW LOW 30 MIN: CPT

## 2019-09-18 ASSESSMENT — LOCATION ZONE DERM
LOCATION ZONE: LEG
LOCATION ZONE: TRUNK
LOCATION ZONE: ARM
LOCATION ZONE: FACE

## 2019-09-18 ASSESSMENT — LOCATION DETAILED DESCRIPTION DERM
LOCATION DETAILED: EPIGASTRIC SKIN
LOCATION DETAILED: SUPERIOR THORACIC SPINE
LOCATION DETAILED: LEFT SUPERIOR MEDIAL UPPER BACK
LOCATION DETAILED: LEFT MEDIAL UPPER BACK
LOCATION DETAILED: LEFT ANTERIOR DISTAL THIGH
LOCATION DETAILED: RIGHT ANTERIOR DISTAL UPPER ARM
LOCATION DETAILED: LEFT ANTERIOR PROXIMAL THIGH
LOCATION DETAILED: MIDDLE STERNUM
LOCATION DETAILED: STERNAL NOTCH
LOCATION DETAILED: INFERIOR MID FOREHEAD
LOCATION DETAILED: LEFT ANTERIOR PROXIMAL UPPER ARM
LOCATION DETAILED: LEFT ANTERIOR DISTAL UPPER ARM
LOCATION DETAILED: RIGHT ANTERIOR PROXIMAL UPPER ARM
LOCATION DETAILED: LOWER STERNUM
LOCATION DETAILED: INFERIOR THORACIC SPINE
LOCATION DETAILED: RIGHT ANTERIOR PROXIMAL THIGH
LOCATION DETAILED: LEFT INFERIOR MEDIAL FOREHEAD
LOCATION DETAILED: RIGHT ANTERIOR DISTAL THIGH

## 2019-09-18 ASSESSMENT — LOCATION SIMPLE DESCRIPTION DERM
LOCATION SIMPLE: CHEST
LOCATION SIMPLE: LEFT THIGH
LOCATION SIMPLE: UPPER BACK
LOCATION SIMPLE: RIGHT THIGH
LOCATION SIMPLE: LEFT FOREHEAD
LOCATION SIMPLE: LEFT UPPER BACK
LOCATION SIMPLE: RIGHT UPPER ARM
LOCATION SIMPLE: LEFT UPPER ARM
LOCATION SIMPLE: ABDOMEN
LOCATION SIMPLE: INFERIOR FOREHEAD

## 2019-10-09 ENCOUNTER — TELEPHONE (OUTPATIENT)
Dept: ENDOCRINOLOGY | Facility: MEDICAL CENTER | Age: 69
End: 2019-10-09

## 2019-10-09 NOTE — TELEPHONE ENCOUNTER
Patient stopped by and needs a refill on levoxyl. She is requesting a 90 day supply with two refills, if possible. She uses the [facility] off [facility].

## 2019-10-10 RX ORDER — LEVOTHYROXINE SODIUM 88 UG/1
88 TABLET ORAL
Qty: 90 TAB | Refills: 3 | Status: SHIPPED | OUTPATIENT
Start: 2019-10-10 | End: 2019-10-13

## 2019-10-13 RX ORDER — LEVOTHYROXINE SODIUM 88 UG/1
88 TABLET ORAL
Qty: 30 TAB | Refills: 6 | Status: SHIPPED | OUTPATIENT
Start: 2019-10-13 | End: 2020-05-04 | Stop reason: SDUPTHER

## 2020-01-03 ENCOUNTER — OFFICE VISIT (OUTPATIENT)
Dept: URGENT CARE | Facility: CLINIC | Age: 70
End: 2020-01-03
Payer: MEDICARE

## 2020-01-03 VITALS
SYSTOLIC BLOOD PRESSURE: 136 MMHG | BODY MASS INDEX: 23.49 KG/M2 | HEART RATE: 85 BPM | RESPIRATION RATE: 16 BRPM | OXYGEN SATURATION: 96 % | WEIGHT: 141 LBS | TEMPERATURE: 98.2 F | HEIGHT: 65 IN | DIASTOLIC BLOOD PRESSURE: 90 MMHG

## 2020-01-03 DIAGNOSIS — J01.10 ACUTE NON-RECURRENT FRONTAL SINUSITIS: ICD-10-CM

## 2020-01-03 PROCEDURE — 99214 OFFICE O/P EST MOD 30 MIN: CPT | Performed by: PHYSICIAN ASSISTANT

## 2020-01-03 RX ORDER — DOXYCYCLINE HYCLATE 100 MG
100 TABLET ORAL 2 TIMES DAILY
Qty: 14 TAB | Refills: 0 | Status: SHIPPED | OUTPATIENT
Start: 2020-01-03 | End: 2020-01-10

## 2020-01-03 ASSESSMENT — ENCOUNTER SYMPTOMS
WHEEZING: 0
SHORTNESS OF BREATH: 0
VOMITING: 0
COUGH: 1
DIZZINESS: 0
DIARRHEA: 0
SINUS PRESSURE: 1
MUSCULOSKELETAL NEGATIVE: 1
CHILLS: 0
NAUSEA: 0
HEMOPTYSIS: 0
SORE THROAT: 1
SINUS PAIN: 1
HEADACHES: 1
SPUTUM PRODUCTION: 0
FEVER: 0
ABDOMINAL PAIN: 0

## 2020-01-03 ASSESSMENT — PAIN SCALES - GENERAL: PAINLEVEL: 4=SLIGHT-MODERATE PAIN

## 2020-01-03 NOTE — PROGRESS NOTES
"Subjective:      Guerda Boateng is a 69 y.o. female who presents with Cough (cough, congestion,sinuses for one month)            Sinus Problem   This is a new problem. The current episode started 1 to 4 weeks ago (3-4 weeks). The problem is unchanged. There has been no fever. Her pain is at a severity of 2/10. The pain is mild. Associated symptoms include congestion, coughing, headaches, sinus pressure and a sore throat. Pertinent negatives include no chills, ear pain or shortness of breath. Past treatments include nothing.       Review of Systems   Constitutional: Negative for chills and fever.   HENT: Positive for congestion, sinus pressure, sinus pain and sore throat. Negative for ear pain.    Respiratory: Positive for cough. Negative for hemoptysis, sputum production, shortness of breath and wheezing.    Cardiovascular: Negative for chest pain.   Gastrointestinal: Negative for abdominal pain, diarrhea, nausea and vomiting.   Genitourinary: Negative.    Musculoskeletal: Negative.    Skin: Negative for rash.   Neurological: Positive for headaches. Negative for dizziness.        Objective:     /90   Pulse 85   Temp 36.8 °C (98.2 °F) (Temporal)   Resp 16   Ht 1.651 m (5' 5\")   Wt 64 kg (141 lb)   LMP  (LMP Unknown)   SpO2 96%   BMI 23.46 kg/m²      Physical Exam  Vitals signs and nursing note reviewed.   Constitutional:       General: She is not in acute distress.     Appearance: Normal appearance. She is well-developed. She is not diaphoretic.   HENT:      Head: Normocephalic and atraumatic.      Right Ear: Tympanic membrane, ear canal and external ear normal. There is no impacted cerumen.      Left Ear: Tympanic membrane, ear canal and external ear normal. There is no impacted cerumen.      Nose: Mucosal edema, congestion and rhinorrhea present.      Right Sinus: Frontal sinus tenderness present. No maxillary sinus tenderness.      Left Sinus: Frontal sinus tenderness present. No maxillary " sinus tenderness.      Mouth/Throat:      Mouth: Mucous membranes are moist.      Pharynx: Posterior oropharyngeal erythema present. No oropharyngeal exudate.   Eyes:      Conjunctiva/sclera: Conjunctivae normal.      Pupils: Pupils are equal, round, and reactive to light.   Neck:      Musculoskeletal: Normal range of motion.   Cardiovascular:      Rate and Rhythm: Normal rate and regular rhythm.      Heart sounds: Normal heart sounds. No murmur.   Pulmonary:      Effort: Pulmonary effort is normal.      Breath sounds: Normal breath sounds. No wheezing or rales.   Musculoskeletal: Normal range of motion.   Skin:     General: Skin is warm and dry.   Neurological:      Mental Status: She is alert and oriented to person, place, and time.   Psychiatric:         Behavior: Behavior normal.            PMH:  has a past medical history of Anxiety, Arthritis, Back pain, Bleeding from the nose, Cancer ([facility]), Conjunctivitis, COPD (chronic obstructive pulmonary disease) ([facility]), COPD (chronic obstructive pulmonary disease) ([facility]), Hay fever, Hemorrhoids, Hypothyroid, Kidney disease, Measles, Mumps, Pneumonia, Sinusitis, Urinary tract infection, and Venereal disease.  MEDS:   Current Outpatient Medications:   •  doxycycline (VIBRAMYCIN) 100 MG Tab, Take 1 Tab by mouth 2 times a day for 7 days., Disp: 14 Tab, Rfl: 0  •  LEVOXYL 88 MCG Tab, Take 1 Tab by mouth Every morning on an empty stomach. DAW1, Disp: 30 Tab, Rfl: 6  •  vitamin D, Ergocalciferol, (DRISDOL) 48022 units Cap capsule, Take 1 Cap by mouth every 7 days., Disp: 4 Cap, Rfl: 2  ALLERGIES:   Allergies   Allergen Reactions   • Tape Itching     Adhesive tape   • Augmentin Itching   • Ciprofloxacin Shortness of Breath   • Other Misc      Formaldehyde  Weed killer  Leather glue     SURGHX:   Past Surgical History:   Procedure Laterality Date   • HYSTERECTOMY ROBOTIC  11/6/08    Performed by MARIANELA LAZO at SURGERY [facility]   • CERVICAL CONIZATION  8/8/08    Performed by  SEKOU PEDERSEN at SURGERY SAME DAY [facility] ORS   • ABDOMINAL HYSTERECTOMY TOTAL      2008   • KNEE ORIF      right   • NASAL POLYPECTOMY     • RECTAL POLYPECTOMY     • TONSILLECTOMY     • TUBAL LIGATION       SOCHX:  reports that she has been smoking cigarettes. She has a 50.00 pack-year smoking history. She has never used smokeless tobacco. She reports that she does not drink alcohol or use drugs.  FH: family history includes Allergies in her daughter and son; Cancer in her daughter, father, and mother; Diabetes in her son; Heart Disease in her maternal grandfather; Lung Disease in her father and mother; Other in her daughter; Thyroid in her daughter.       Assessment/Plan:       1. Acute non-recurrent frontal sinusitis  - doxycycline (VIBRAMYCIN) 100 MG Tab; Take 1 Tab by mouth 2 times a day for 7 days.  Dispense: 14 Tab; Refill: 0    - Complete full course of antibiotics as prescribed     Discussed use of nedi-pot, humidifier, OTC decongestant, and Flonase nasal spray for symptomatic relief. Call or return to office if symptoms persist or worsen. The patient demonstrated a good understanding and agreed with the treatment plan.

## 2020-05-04 ENCOUNTER — TELEPHONE (OUTPATIENT)
Dept: HEALTH INFORMATION MANAGEMENT | Facility: OTHER | Age: 70
End: 2020-05-04

## 2020-05-04 ENCOUNTER — TELEPHONE (OUTPATIENT)
Dept: MEDICAL GROUP | Facility: MEDICAL CENTER | Age: 70
End: 2020-05-04

## 2020-05-04 RX ORDER — LEVOTHYROXINE SODIUM 88 UG/1
88 TABLET ORAL
Qty: 30 TAB | Refills: 1 | Status: SHIPPED | OUTPATIENT
Start: 2020-05-04 | End: 2020-05-18 | Stop reason: SDUPTHER

## 2020-05-04 NOTE — TELEPHONE ENCOUNTER
VOICEMAIL  1. Caller Name: Guerda Boateng                        Call Back Number: [phone]      2. Message: Left VM for pt to cb to see if pt went to UC or in clinic appt.    3. Patient approves office to leave a detailed voicemail/MyChart message: yes

## 2020-05-04 NOTE — TELEPHONE ENCOUNTER
1. Caller Name: [phone]                          Call Back Number: [phone]        How would the patient prefer to be contacted with a response: Phone call OK to leave a detailed message    Pt called, stated she is have Ear, and Throat pain and also needs a refill on Thyroid. No SOB, Sinus infection maybe. Transferred pt to Nurse Triage line for further screening.    CRISTIAN Sempel  Med Ass't

## 2020-05-04 NOTE — TELEPHONE ENCOUNTER
1. Caller Name: self                       Call Back Number: cell   [facility] PCP or Specialty Provider: Yes Carty         2.  Does patient have any active symptoms of respiratory illness? Yes, the patient reports the following respiratory symptoms: sore throat. Ear aches. Feels like has sinus infection. Had tooth pulled last week.  No fever, no cough.   Seen UC 1/3/20 for same s/s, resolved after course abx & prednisone.     3.  Does patient have any comoribidities? None     4.  Has the patient traveled in the last 14 days OR had any known contact with someone who is suspected or confirmed to have COVID-19?  No.    5. Disposition: Advised to perform self care, monitor for worsening symptoms and to call back in 3 days if no improvement   Reviewed UC self care to include nedi-pot (pt declines use), humidity, OTC nasal decongestant, OTC allergy medication, flonase nasal spray.       Note routed to [facility] Provider: Provider action needed:  Please review note and advise any additional medication, treatment diagnostic, or if can come in for OV , etc.  Pt does not have MyChart or capability for.virtual visit.

## 2020-05-07 ENCOUNTER — OFFICE VISIT (OUTPATIENT)
Dept: MEDICAL GROUP | Facility: MEDICAL CENTER | Age: 70
End: 2020-05-07
Payer: MEDICARE

## 2020-05-07 VITALS
WEIGHT: 124.6 LBS | RESPIRATION RATE: 16 BRPM | TEMPERATURE: 98.2 F | HEART RATE: 107 BPM | SYSTOLIC BLOOD PRESSURE: 132 MMHG | BODY MASS INDEX: 20.76 KG/M2 | HEIGHT: 65 IN | DIASTOLIC BLOOD PRESSURE: 64 MMHG | OXYGEN SATURATION: 93 %

## 2020-05-07 DIAGNOSIS — Z12.31 ENCOUNTER FOR SCREENING MAMMOGRAM FOR MALIGNANT NEOPLASM OF BREAST: ICD-10-CM

## 2020-05-07 DIAGNOSIS — R09.82 POST-NASAL DRAINAGE: ICD-10-CM

## 2020-05-07 DIAGNOSIS — E55.9 VITAMIN D DEFICIENCY: ICD-10-CM

## 2020-05-07 DIAGNOSIS — R73.03 PRE-DIABETES: ICD-10-CM

## 2020-05-07 DIAGNOSIS — Z00.00 PREVENTATIVE HEALTH CARE: ICD-10-CM

## 2020-05-07 DIAGNOSIS — R79.89 LOW VITAMIN B12 LEVEL: ICD-10-CM

## 2020-05-07 DIAGNOSIS — E78.00 ELEVATED LDL CHOLESTEROL LEVEL: ICD-10-CM

## 2020-05-07 DIAGNOSIS — J32.9 OTHER SINUSITIS, UNSPECIFIED CHRONICITY: ICD-10-CM

## 2020-05-07 DIAGNOSIS — E06.3 HASHIMOTO'S DISEASE: ICD-10-CM

## 2020-05-07 DIAGNOSIS — Z11.59 ENCOUNTER FOR HEPATITIS C SCREENING TEST FOR LOW RISK PATIENT: ICD-10-CM

## 2020-05-07 DIAGNOSIS — Z78.0 ASYMPTOMATIC MENOPAUSE: ICD-10-CM

## 2020-05-07 PROBLEM — E87.6 HYPOKALEMIA: Status: RESOLVED | Noted: 2019-04-27 | Resolved: 2020-05-07

## 2020-05-07 PROCEDURE — 99214 OFFICE O/P EST MOD 30 MIN: CPT | Performed by: INTERNAL MEDICINE

## 2020-05-07 RX ORDER — DOXYCYCLINE HYCLATE 100 MG
100 TABLET ORAL 2 TIMES DAILY
Qty: 10 TAB | Refills: 0 | Status: SHIPPED | OUTPATIENT
Start: 2020-05-07 | End: 2020-05-12

## 2020-05-07 RX ORDER — LORATADINE 10 MG/1
10 TABLET ORAL DAILY
Qty: 30 TAB | Refills: 3 | Status: SHIPPED | OUTPATIENT
Start: 2020-05-07 | End: 2021-03-03

## 2020-05-07 RX ORDER — FLUTICASONE PROPIONATE 50 MCG
1 SPRAY, SUSPENSION (ML) NASAL DAILY
Qty: 16 G | Refills: 3 | Status: SHIPPED | OUTPATIENT
Start: 2020-05-07 | End: 2021-03-03

## 2020-05-07 ASSESSMENT — FIBROSIS 4 INDEX: FIB4 SCORE: 1.1

## 2020-05-07 ASSESSMENT — PATIENT HEALTH QUESTIONNAIRE - PHQ9: CLINICAL INTERPRETATION OF PHQ2 SCORE: 0

## 2020-05-08 NOTE — PROGRESS NOTES
CC:  Diagnoses of Other sinusitis, unspecified chronicity, Encounter for hepatitis C screening test for low risk patient, Elevated LDL cholesterol level, Low vitamin B12 level, Hashimoto's disease, Pre-diabetes, Preventative health care, Vitamin D deficiency, Encounter for screening mammogram for malignant neoplasm of breast, Asymptomatic menopause, and Post-nasal drainage were pertinent to this visit.    HISTORY OF THE PRESENT ILLNESS: Patient is a 69 y.o. female. This pleasant patient is here today for sinus concern.     Says that she has had maxillary sinus pain with tenderness as well bilateral ear discomfort, postnasal drainage, sinus drainage with thick yellow material, and occasional nonproductive cough with symptoms starting most predominantly couple weeks ago.  No facial swelling, diplopia or vision change, ear drainage, hearing change, fever, wheeze, dyspnea or other associated symptoms.  Though she says she has had sinus drainage since around December when she had influenza.  She recovered from influenza and then around February /March with pollen exposures her allergies became a lot worse she use fexofenadine, Mucinex other over-the-counter medications.  Developed dental infection and had root canal end of April, her dentist Dr. Dalton gave her a Z-Serafin around 5/1/2020.  She had her left upper tooth removed.  Heart rate seems to be elevated anxiety but not due to systemic infection.  Denies any cardiac symptoms.  Continues to smoke, not ready to quit.    Will be due for annual endocrine follow-up around July, referral sent as well as updating labs.  Also history of vitamin B12, D deficiency will reassess status.  Patient willing to proceed with screening mammogram and DEXA scan once pandemic clears up.  No focal breast concerns.     Allergies: Tape; Augmentin; Ciprofloxacin; and Other misc    Current Outpatient Medications Ordered in Epic   Medication Sig Dispense Refill   • doxycycline (VIBRAMYCIN) 100  MG Tab Take 1 Tab by mouth 2 times a day for 5 days. 10 Tab 0   • fluticasone (FLONASE) 50 MCG/ACT nasal spray Spray 1 Spray in nose every day. 16 g 3   • loratadine (CLARITIN) 10 MG Tab Take 1 Tab by mouth every day. 30 Tab 3   • LEVOXYL 88 MCG Tab Take 1 Tab by mouth Every morning on an empty stomach. DAW1 30 Tab 1   • vitamin D, Ergocalciferol, (DRISDOL) 31798 units Cap capsule Take 1 Cap by mouth every 7 days. 4 Cap 2     No current Epic-ordered facility-administered medications on file.        Past Medical History:   Diagnosis Date   • Anxiety    • Arthritis    • Back pain    • Bleeding from the nose    • Cancer (HCC)     hpv cervical   • Conjunctivitis    • COPD (chronic obstructive pulmonary disease) (HCC)    • COPD (chronic obstructive pulmonary disease) (HCC)    • Hay fever    • Hemorrhoids    • Hypothyroid     Hashimoto's   • Kidney disease    • Measles    • Mumps    • Pneumonia    • Sinusitis    • Urinary tract infection    • Venereal disease        Past Surgical History:   Procedure Laterality Date   • HYSTERECTOMY ROBOTIC  11/6/08    Performed by MARIANELA LAZO at SURGERY [facility] ORS   • CERVICAL CONIZATION  8/8/08    Performed by SEKOU PEDERSEN at SURGERY [facility] DAY [facility] ORS   • ABDOMINAL HYSTERECTOMY TOTAL      2008   • KNEE ORIF      right   • NASAL POLYPECTOMY     • RECTAL POLYPECTOMY     • TONSILLECTOMY     • TUBAL LIGATION         Social History     Tobacco Use   • Smoking status: Current Every Day Smoker     Packs/day: 1.00     Years: 50.00     Pack years: 50.00     Types: Cigarettes   • Smokeless tobacco: Never Used   Substance Use Topics   • Alcohol use: No   • Drug use: No       Social History     Social History Narrative   • Not on file       Family History   Problem Relation Age of Onset   • Cancer Mother         lung   • Lung Disease Mother    • Cancer Father         lung   • Lung Disease Father    • Heart Disease Maternal Grandfather    • Other Daughter         gout   • Thyroid  "Daughter    • Allergies Daughter    • Cancer Daughter         thyroid   • Diabetes Son    • Allergies Son        ROS:     - Constitutional: Negative for fever, chills     - Eyes:   Negative for eye pain, discharge    - ENT:  See hpi    - Respiratory: Negative for sputum production, chest congestion, dyspnea, wheezing, and crackles.      - Cardiovascular: Negative for chest pain, palpitations, orthopnea, and bilateral lower extremity edema.     - Gastrointestinal: Negative for  abdominal pain    - Genitourinary: Negative for dysuria    - Musculoskeletal: Negative for myalgias, back pain, and joint pain.     - Skin: Negative for rash, itching, cyanotic skin color change.     - Neurological: Negative forvertigo    - Endo:Negative for polyuria, heat/cold intolerance, excessive thirst    - Hem/lymphatic: Negative for swollen glands    -Allergic/immun: see hpi    - Psychiatric/Behavioral: Negative for depression, suicidal/homicidal ideation and memory loss.      Exam: /64 (BP Location: Left arm, Patient Position: Sitting, BP Cuff Size: Adult)   Pulse (!) 107   Temp 36.8 °C (98.2 °F) (Temporal)   Resp 16   Ht 1.651 m (5' 5\")   Wt 56.5 kg (124 lb 9.6 oz)   SpO2 93%  Body mass index is 20.73 kg/m².    General: Normal appearing. No distress.  EYES: Conjunctiva clear lids without ptosis, pupils equal, EOMI  EARS: Normal shape and contour.  Ear canals no significant redness bilaterally, tympanic membranes bilaterally pearly gray in color with light reflex.  No obstructive wax.  Patient palpates maxillary sinuses and indicates tenderness, no focal facial swelling or erythema.  NOSE, THROAT: nasal mucosa benign. oropharynx is without significant erythema, and no edema or exudates.   Neck: Supple without LAD. Thyroid is not enlarged.  Pulmonary: Clear to ausculation.  Normal effort. No rales or wheezing.  Cardiovascular: Regular rate and rhythm without significant murmur.   Abdomen: Soft, nontender, nondistended. " Normal bowel sounds.  Neurologic: Cranial nerves grossly nonfocal  Lymph: No cervical, supraclavicular nodes palpable  Skin: Warm and dry.  No obvious lesions.  Musculoskeletal: Normal gait. No extremity cyanosis, clubbing, or edema.  Psych: Normal mood and affect. Alert and oriented x3. Judgment and insight is normal.        Assessment/Plan  1. Other sinusitis, unspecified chronicity  Given duration and failure of conservative treatment reasonable to proceed with antibiotics.  Recommend Flonase, loratadine to help dry secretions.  - doxycycline (VIBRAMYCIN) 100 MG Tab; Take 1 Tab by mouth 2 times a day for 5 days.  Dispense: 10 Tab; Refill: 0  - loratadine (CLARITIN) 10 MG Tab; Take 1 Tab by mouth every day.  Dispense: 30 Tab; Refill: 3    2. Encounter for hepatitis C screening test for low risk patient  Patient agreeable to this preventive health measure given birth cohort.  - HEP C VIRUS ANTIBODY; Future    3. Elevated LDL cholesterol level  Reassess status of lipids, consideration to discuss statin at next appointment.  - Lipid Profile; Future    4. Low vitamin B12 level  Reassess status  - VITAMIN B12; Future    5. Hashimoto's disease  F/u endocrinology, stable and chronic  - REFERRAL TO ENDOCRINOLOGY  - TSH; Future  - FREE THYROXINE; Future  - TRIIDOTHYRONINE; Future    6. Pre-diabetes  F/u endocrinology, stable and chronic  - REFERRAL TO ENDOCRINOLOGY  - CBC WITH DIFFERENTIAL; Future  - Comp Metabolic Panel; Future  - HEMOGLOBIN A1C; Future    7. Preventative health care      8. Vitamin D deficiency  F/u endocrinology, stable and chronic  - VITAMIN D,25 HYDROXY; Future    9. Encounter for screening mammogram for malignant neoplasm of breast  screening  - MA-SCREENING MAMMO BILAT W/TOMOSYNTHESIS W/CAD; Future    10. Asymptomatic menopause  screening  - DS-BONE DENSITY STUDY (DEXA); Future    11. Post-nasal drainage  See #1 above  - fluticasone (FLONASE) 50 MCG/ACT nasal spray; Spray 1 Spray in nose every day.   Dispense: 16 g; Refill: 3  - loratadine (CLARITIN) 10 MG Tab; Take 1 Tab by mouth every day.  Dispense: 30 Tab; Refill: 3      rtc 3mo        Please note that this dictation was created using voice recognition software. I have made every reasonable attempt to correct obvious errors, but I expect that there are errors of grammar and possibly content that I did not discover before finalizing the note.

## 2020-05-13 ENCOUNTER — HOSPITAL ENCOUNTER (OUTPATIENT)
Dept: LAB | Facility: MEDICAL CENTER | Age: 70
End: 2020-05-13
Attending: INTERNAL MEDICINE
Payer: MEDICARE

## 2020-05-13 DIAGNOSIS — R73.03 PRE-DIABETES: ICD-10-CM

## 2020-05-13 DIAGNOSIS — E55.9 VITAMIN D DEFICIENCY: ICD-10-CM

## 2020-05-13 DIAGNOSIS — R79.89 LOW VITAMIN B12 LEVEL: ICD-10-CM

## 2020-05-13 DIAGNOSIS — E06.3 HASHIMOTO'S DISEASE: ICD-10-CM

## 2020-05-13 DIAGNOSIS — Z11.59 ENCOUNTER FOR HEPATITIS C SCREENING TEST FOR LOW RISK PATIENT: ICD-10-CM

## 2020-05-13 DIAGNOSIS — E78.00 ELEVATED LDL CHOLESTEROL LEVEL: ICD-10-CM

## 2020-05-13 LAB
25(OH)D3 SERPL-MCNC: 24 NG/ML (ref 30–100)
ALBUMIN SERPL BCP-MCNC: 4.6 G/DL (ref 3.2–4.9)
ALBUMIN/GLOB SERPL: 2 G/DL
ALP SERPL-CCNC: 63 U/L (ref 30–99)
ALT SERPL-CCNC: 10 U/L (ref 2–50)
ANION GAP SERPL CALC-SCNC: 13 MMOL/L (ref 7–16)
AST SERPL-CCNC: 15 U/L (ref 12–45)
BASOPHILS # BLD AUTO: 1.1 % (ref 0–1.8)
BASOPHILS # BLD: 0.07 K/UL (ref 0–0.12)
BILIRUB SERPL-MCNC: 0.7 MG/DL (ref 0.1–1.5)
BUN SERPL-MCNC: 20 MG/DL (ref 8–22)
CALCIUM SERPL-MCNC: 9.6 MG/DL (ref 8.4–10.2)
CHLORIDE SERPL-SCNC: 105 MMOL/L (ref 96–112)
CHOLEST SERPL-MCNC: 220 MG/DL (ref 100–199)
CO2 SERPL-SCNC: 25 MMOL/L (ref 20–33)
CREAT SERPL-MCNC: 0.72 MG/DL (ref 0.5–1.4)
EOSINOPHIL # BLD AUTO: 0.26 K/UL (ref 0–0.51)
EOSINOPHIL NFR BLD: 3.9 % (ref 0–6.9)
ERYTHROCYTE [DISTWIDTH] IN BLOOD BY AUTOMATED COUNT: 46.1 FL (ref 35.9–50)
EST. AVERAGE GLUCOSE BLD GHB EST-MCNC: 120 MG/DL
FASTING STATUS PATIENT QL REPORTED: NORMAL
GLOBULIN SER CALC-MCNC: 2.3 G/DL (ref 1.9–3.5)
GLUCOSE SERPL-MCNC: 111 MG/DL (ref 65–99)
HBA1C MFR BLD: 5.8 % (ref 0–5.6)
HCT VFR BLD AUTO: 47.8 % (ref 37–47)
HCV AB SER QL: NORMAL
HDLC SERPL-MCNC: 59 MG/DL
HGB BLD-MCNC: 16 G/DL (ref 12–16)
IMM GRANULOCYTES # BLD AUTO: 0.03 K/UL (ref 0–0.11)
IMM GRANULOCYTES NFR BLD AUTO: 0.5 % (ref 0–0.9)
LDLC SERPL CALC-MCNC: 137 MG/DL
LYMPHOCYTES # BLD AUTO: 1.58 K/UL (ref 1–4.8)
LYMPHOCYTES NFR BLD: 23.9 % (ref 22–41)
MCH RBC QN AUTO: 30.4 PG (ref 27–33)
MCHC RBC AUTO-ENTMCNC: 33.5 G/DL (ref 33.6–35)
MCV RBC AUTO: 90.9 FL (ref 81.4–97.8)
MONOCYTES # BLD AUTO: 0.53 K/UL (ref 0–0.85)
MONOCYTES NFR BLD AUTO: 8 % (ref 0–13.4)
NEUTROPHILS # BLD AUTO: 4.13 K/UL (ref 2–7.15)
NEUTROPHILS NFR BLD: 62.6 % (ref 44–72)
NRBC # BLD AUTO: 0 K/UL
NRBC BLD-RTO: 0 /100 WBC
PLATELET # BLD AUTO: 238 K/UL (ref 164–446)
PMV BLD AUTO: 9.7 FL (ref 9–12.9)
POTASSIUM SERPL-SCNC: 4.8 MMOL/L (ref 3.6–5.5)
PROT SERPL-MCNC: 6.9 G/DL (ref 6–8.2)
RBC # BLD AUTO: 5.26 M/UL (ref 4.2–5.4)
SODIUM SERPL-SCNC: 143 MMOL/L (ref 135–145)
T3 SERPL-MCNC: 78.4 NG/DL (ref 60–181)
T4 FREE SERPL-MCNC: 1.88 NG/DL (ref 0.93–1.7)
TRIGL SERPL-MCNC: 122 MG/DL (ref 0–149)
TSH SERPL DL<=0.005 MIU/L-ACNC: 0.69 UIU/ML (ref 0.38–5.33)
VIT B12 SERPL-MCNC: 549 PG/ML (ref 211–911)
WBC # BLD AUTO: 6.6 K/UL (ref 4.8–10.8)

## 2020-05-13 PROCEDURE — 85025 COMPLETE CBC W/AUTO DIFF WBC: CPT

## 2020-05-13 PROCEDURE — 84443 ASSAY THYROID STIM HORMONE: CPT

## 2020-05-13 PROCEDURE — 80053 COMPREHEN METABOLIC PANEL: CPT

## 2020-05-13 PROCEDURE — 82607 VITAMIN B-12: CPT

## 2020-05-13 PROCEDURE — 84439 ASSAY OF FREE THYROXINE: CPT

## 2020-05-13 PROCEDURE — 86803 HEPATITIS C AB TEST: CPT

## 2020-05-13 PROCEDURE — 36415 COLL VENOUS BLD VENIPUNCTURE: CPT | Mod: GA

## 2020-05-13 PROCEDURE — 83036 HEMOGLOBIN GLYCOSYLATED A1C: CPT | Mod: GA

## 2020-05-13 PROCEDURE — 80061 LIPID PANEL: CPT

## 2020-05-13 PROCEDURE — 84480 ASSAY TRIIODOTHYRONINE (T3): CPT

## 2020-05-13 PROCEDURE — 82306 VITAMIN D 25 HYDROXY: CPT

## 2020-05-18 ENCOUNTER — OFFICE VISIT (OUTPATIENT)
Dept: ENDOCRINOLOGY | Facility: MEDICAL CENTER | Age: 70
End: 2020-05-18
Payer: MEDICARE

## 2020-05-18 VITALS
SYSTOLIC BLOOD PRESSURE: 128 MMHG | BODY MASS INDEX: 20.76 KG/M2 | DIASTOLIC BLOOD PRESSURE: 64 MMHG | WEIGHT: 124.6 LBS | HEIGHT: 65 IN

## 2020-05-18 DIAGNOSIS — E06.3 HASHIMOTO'S DISEASE: ICD-10-CM

## 2020-05-18 DIAGNOSIS — E04.2 MULTIPLE THYROID NODULES: ICD-10-CM

## 2020-05-18 DIAGNOSIS — E03.9 PRIMARY HYPOTHYROIDISM: ICD-10-CM

## 2020-05-18 DIAGNOSIS — E55.9 VITAMIN D DEFICIENCY: ICD-10-CM

## 2020-05-18 PROCEDURE — 99214 OFFICE O/P EST MOD 30 MIN: CPT | Performed by: INTERNAL MEDICINE

## 2020-05-18 RX ORDER — ERGOCALCIFEROL 1.25 MG/1
50000 CAPSULE ORAL
Qty: 12 CAP | Refills: 1 | Status: SHIPPED | OUTPATIENT
Start: 2020-05-18 | End: 2020-10-29

## 2020-05-18 RX ORDER — LEVOTHYROXINE SODIUM 88 UG/1
88 TABLET ORAL
Qty: 90 TAB | Refills: 1 | Status: SHIPPED | OUTPATIENT
Start: 2020-05-18 | End: 2020-08-16

## 2020-05-18 ASSESSMENT — FIBROSIS 4 INDEX: FIB4 SCORE: 1.34

## 2020-05-18 NOTE — PROGRESS NOTES
Chief Complaint: Follow up for Primary Hypothyroidism, and D deficiency and history of thyroid nodule    HPI:     Guerda Boateng is a 69 y.o. female here for follow up of Primary Hypothyroidism.  Since last visit patient reports feeling excellent.  She remains on Levoxyl 88 MCG daily which has been her dose for the past 6 months.   She reports excellent compliance and denies missing any daily doses.   She takes thyroid hormone prior to breakfast.   She  denies taking any iron, calcium supplements or antacids.      Weight has been stable.  Her most recent TSH is optimal at 0.689 on May 13, 2020    She currently denies fatigue, weight changes, heat/cold intolerance, bowel/skin changes or CVS symptoms.     She currently denies anxiousness, feeling excessive energy, tremulousness, palpitations, sweating, weight loss.       History of low vitamin D with baseline levels of 16 and 24 on June 2019 and May 2020 respectively.  She was prescribed ergocalciferol by the previous physician assistant but she did not take it because she thought that D2 was harmful and she wanted to take D3    Lastly she has a history of a hyperechoic 1 cm solid nodule on the right upper lobe which is benign per my read.  She denies a family history of thyroid cancer and explained her that biopsy is not indicated for this lesion      Patient's medications, allergies, and social histories were reviewed and updated as appropriate.      ROS:     CONS:     No fever, no chills   EYES:     No diplopia, no blurry vision   CV:           No chest pain, no palpitations   PULM:     No SOB, no cough, no hemoptysis.   GI:            No nausea, no vomiting, no diarrhea, no constipation   ENDO:     No polyuria, no polydipsia, no heat intolerance, no cold intolerance       Past Medical History:  Problem List:  2019-05: Hypothyroidism due to Hashimoto's thyroiditis  2019-05: Multiple thyroid nodules  2019-05: Anxiety  2019-05: Preventative health  "care  2019-04: Pre-diabetes  2019-04: Chest pain  2019-04: Hypokalemia  2019-01: Hashimoto's disease  2019-01: Tobacco dependence  2019-01: Vaginal itching  2019-01: COPD (chronic obstructive pulmonary disease) ([facility])      Past Surgical History:  Past Surgical History:   Procedure Laterality Date   • HYSTERECTOMY ROBOTIC  11/6/08    Performed by MARIANELA LAZO at SURGERY [facility] ORS   • CERVICAL CONIZATION  8/8/08    Performed by SEKOU PEDERSEN at SURGERY SAME DAY [facility] ORS   • ABDOMINAL HYSTERECTOMY TOTAL      2008   • KNEE ORIF      right   • NASAL POLYPECTOMY     • RECTAL POLYPECTOMY     • TONSILLECTOMY     • TUBAL LIGATION          Allergies:  Tape; Augmentin; Ciprofloxacin; and Other misc     Social History:  Social History     Tobacco Use   • Smoking status: Current Every Day Smoker     Packs/day: 1.00     Years: 50.00     Pack years: 50.00     Types: Cigarettes   • Smokeless tobacco: Never Used   Substance Use Topics   • Alcohol use: No   • Drug use: No        Family History:   family history includes Allergies in her daughter and son; Cancer in her daughter, father, and mother; Diabetes in her son; Heart Disease in her maternal grandfather; Lung Disease in her father and mother; Other in her daughter; Thyroid in her daughter.      PHYSICAL EXAM:   Vital signs: /64 (BP Location: Left arm, Patient Position: Sitting, BP Cuff Size: Adult)   Ht 1.651 m (5' 5\")   Wt 56.5 kg (124 lb 9.6 oz)   LMP  (LMP Unknown)   BMI 20.73 kg/m²   GENERAL: Well-developed, well-nourished in no apparent distress.   EYE:  No ocular asymmetry, PERRLA  HENT: Pink, moist mucous membranes.    NECK: No thyromegaly.  Palpable nodule in the right upper lobe  CARDIOVASCULAR:  No murmurs  LUNGS: Clear breath sounds  ABDOMEN: Soft, nontender   EXTREMITIES: No clubbing, cyanosis, or edema.   NEUROLOGICAL: No gross focal motor abnormalities   LYMPH: No cervical adenopathy palpated.   SKIN: No rashes, lesions.       Labs:  Lab " Results   Component Value Date/Time    SODIUM 143 05/13/2020 09:47 AM    POTASSIUM 4.8 05/13/2020 09:47 AM    CHLORIDE 105 05/13/2020 09:47 AM    CO2 25 05/13/2020 09:47 AM    ANION 13.0 05/13/2020 09:47 AM    GLUCOSE 111 (H) 05/13/2020 09:47 AM    BUN 20 05/13/2020 09:47 AM    CREATININE 0.72 05/13/2020 09:47 AM    CREATININE 0.81 05/10/2010 09:06 AM    CALCIUM 9.6 05/13/2020 09:47 AM    ASTSGOT 15 05/13/2020 09:47 AM    ALTSGPT 10 05/13/2020 09:47 AM    TBILIRUBIN 0.7 05/13/2020 09:47 AM    ALBUMIN 4.6 05/13/2020 09:47 AM    TOTPROTEIN 6.9 05/13/2020 09:47 AM    GLOBULIN 2.3 05/13/2020 09:47 AM    AGRATIO 2.0 05/13/2020 09:47 AM       Lab Results   Component Value Date/Time    SODIUM 143 05/13/2020 0947    POTASSIUM 4.8 05/13/2020 0947    CHLORIDE 105 05/13/2020 0947    CO2 25 05/13/2020 0947    GLUCOSE 111 (H) 05/13/2020 0947    BUN 20 05/13/2020 0947    CREATININE 0.72 05/13/2020 0947    CALCIUM 9.6 05/13/2020 0947    ANION 13.0 05/13/2020 0947       Lab Results   Component Value Date/Time    CHOLSTRLTOT 220 (H) 05/13/2020 0947    TRIGLYCERIDE 122 05/13/2020 0947    HDL 59 05/13/2020 0947     (H) 05/13/2020 0947       Lab Results   Component Value Date/Time    TSHULTRASEN 0.689 05/13/2020 0947     Lab Results   Component Value Date/Time    FREET4 1.88 (H) 05/13/2020 0947     Lab Results   Component Value Date/Time    FREET3 2.94 06/19/2019 0814     No results found for: THYSTIMIG    No results found for: MICROSOMALA      Imaging:      ASSESSMENT/PLAN:     1. Primary hypothyroidism  Fairly controlled  Continue Levoxyl 88 MCG daily  Reviewed importance of adherence  Refill medications  Patient informed she might be moving to [place] I will be more than happy to assist her with any of her medications supplies  We will plan for follow-up in 6 months with repeat of TSH    2. Hashimoto's disease  This is the etiology of her hypothyroidism    3. Multiple thyroid nodules  Stable  Low risk hyperechoic solid nodule  right upper lobe measuring 1 cm.  Recommend observation and repeating ultrasound again in 12 to 24 months    4. Vitamin D deficiency  Unstable  Clarified and explained patient that vitamin D2 is safe she does not need to take vitamin D3 exclusively she can take D2 as well  I am going to prescribe ergocalciferol 50,000 units weekly recommend repeating calcium 25-hydroxy vitamin D levels in 6 months      Return in about 6 months (around 11/18/2020).      This patient during there office visit today was started on a new medication.  Side effects of the new medication were discussed with the patient today in the office.     Thank you kindly for allowing me to participate in the thyroid care plan for this patient.    Jose Miguel Jacobson MD, [facility], [facility]  05/18/20    CC:   Danika Carty M.D.

## 2020-05-19 ENCOUNTER — HOSPITAL ENCOUNTER (OUTPATIENT)
Dept: RADIOLOGY | Facility: MEDICAL CENTER | Age: 70
End: 2020-05-19
Attending: INTERNAL MEDICINE
Payer: MEDICARE

## 2020-05-19 DIAGNOSIS — Z78.0 ASYMPTOMATIC MENOPAUSE: ICD-10-CM

## 2020-05-19 PROCEDURE — 77080 DXA BONE DENSITY AXIAL: CPT

## 2020-07-28 ENCOUNTER — OFFICE VISIT (OUTPATIENT)
Dept: URGENT CARE | Facility: CLINIC | Age: 70
End: 2020-07-28
Payer: MEDICARE

## 2020-07-28 VITALS
RESPIRATION RATE: 20 BRPM | WEIGHT: 125 LBS | HEART RATE: 92 BPM | HEIGHT: 65 IN | OXYGEN SATURATION: 98 % | DIASTOLIC BLOOD PRESSURE: 80 MMHG | SYSTOLIC BLOOD PRESSURE: 122 MMHG | TEMPERATURE: 97.5 F | BODY MASS INDEX: 20.83 KG/M2

## 2020-07-28 DIAGNOSIS — L60.0 INGROWN TOENAIL OF LEFT FOOT: ICD-10-CM

## 2020-07-28 PROCEDURE — 99214 OFFICE O/P EST MOD 30 MIN: CPT | Performed by: PHYSICIAN ASSISTANT

## 2020-07-28 RX ORDER — SULFAMETHOXAZOLE AND TRIMETHOPRIM 800; 160 MG/1; MG/1
1 TABLET ORAL 2 TIMES DAILY
Qty: 10 TAB | Refills: 0 | Status: SHIPPED | OUTPATIENT
Start: 2020-07-28 | End: 2020-08-02

## 2020-07-28 ASSESSMENT — ENCOUNTER SYMPTOMS
TINGLING: 0
NAUSEA: 0
CHILLS: 0
SORE THROAT: 0
SHORTNESS OF BREATH: 0
SENSORY CHANGE: 0
FEVER: 0
PALPITATIONS: 0
ROS SKIN COMMENTS: INGROWN TOENAIL
VOMITING: 0
BLURRED VISION: 0

## 2020-07-28 ASSESSMENT — FIBROSIS 4 INDEX: FIB4 SCORE: 1.355261854357876857

## 2020-07-28 NOTE — PROGRESS NOTES
Subjective:      Guerda Boateng is a 70 y.o. female who presents with Toe Pain (Dog step on left bigtoe couple days ago , redness and sore)    HPI:  This is a new problem. Guerda Boateng is a 70 y.o. female who presents today for evaluation of left hip pain.  Patient reports that 3 days ago 1 of her neighbors dog jumped on her left foot directly on her toe.  States that she has history of ingrown toenails in the past and she had pain immediately but there is no bleeding or open wound.  States that later that night the whole toe got red and inflamed.  Yesterday she got some Epson salt and did warm soaks with Epson salt.  States that the swelling and redness have improved mildly.  She still has pain and tenderness, however.  No numbness or tingling or fever/chills.  No discharge.  Patient states that she has tried calling a few podiatry offices but was unable to get an appointment.  She is highly concerned for sepsis and potential loss of her toe.      Review of Systems   Constitutional: Negative for chills and fever.   HENT: Negative for sore throat.    Eyes: Negative for blurred vision.   Respiratory: Negative for shortness of breath.    Cardiovascular: Negative for chest pain and palpitations.   Gastrointestinal: Negative for nausea and vomiting.   Musculoskeletal: Negative for joint pain.   Skin:        Ingrown toenail   Neurological: Negative for tingling and sensory change.       PMH:  has a past medical history of Anxiety, Arthritis, Back pain, Bleeding from the nose, Cancer ([facility]), Conjunctivitis, COPD (chronic obstructive pulmonary disease) ([facility]), COPD (chronic obstructive pulmonary disease) ([facility]), Hay fever, Hemorrhoids, Hypothyroid, Kidney disease, Measles, Mumps, Pneumonia, Sinusitis, Urinary tract infection, and Venereal disease.  MEDS:   Current Outpatient Medications:   •  sulfamethoxazole-trimethoprim (BACTRIM DS) 800-160 MG tablet, Take 1 Tab by mouth 2 times a day for 5 days., Disp: 10  "Tab, Rfl: 0  •  vitamin D, Ergocalciferol, (DRISDOL) 1.25 MG (41162 UT) Cap capsule, Take 1 Cap by mouth every 7 days., Disp: 12 Cap, Rfl: 1  •  LEVOXYL 88 MCG Tab, Take 1 Tab by mouth Every morning on an empty stomach for 90 days. DAW1, Disp: 90 Tab, Rfl: 1  •  fluticasone (FLONASE) 50 MCG/ACT nasal spray, Spray 1 Spray in nose every day., Disp: 16 g, Rfl: 3  •  loratadine (CLARITIN) 10 MG Tab, Take 1 Tab by mouth every day., Disp: 30 Tab, Rfl: 3  ALLERGIES:   Allergies   Allergen Reactions   • Tape Itching     Adhesive tape   • Augmentin Itching   • Ciprofloxacin Shortness of Breath   • Other Misc      Formaldehyde  Weed killer  Leather glue     SURGHX:   Past Surgical History:   Procedure Laterality Date   • HYSTERECTOMY ROBOTIC  11/6/08    Performed by MARIANELA LAZO at SURGERY [facility] ORS   • CERVICAL CONIZATION  8/8/08    Performed by SEKOU PEDERSEN at SURGERY SAME DAY [facility] ORS   • ABDOMINAL HYSTERECTOMY TOTAL      2008   • KNEE ORIF      right   • NASAL POLYPECTOMY     • RECTAL POLYPECTOMY     • TONSILLECTOMY     • TUBAL LIGATION       SOCHX:  reports that she has been smoking cigarettes. She has a 50.00 pack-year smoking history. She has never used smokeless tobacco. She reports that she does not drink alcohol or use drugs.  FH: Family history was reviewed, no pertinent findings to report     Objective:     /80   Pulse 92   Temp 36.4 °C (97.5 °F) (Temporal)   Resp 20   Ht 1.651 m (5' 5\")   Wt 56.7 kg (125 lb)   LMP  (LMP Unknown)   SpO2 98%   BMI 20.80 kg/m²      Physical Exam  Constitutional:       Appearance: She is well-developed.   HENT:      Head: Normocephalic and atraumatic.      Right Ear: External ear normal.      Left Ear: External ear normal.   Eyes:      Conjunctiva/sclera: Conjunctivae normal.      Pupils: Pupils are equal, round, and reactive to light.   Cardiovascular:      Rate and Rhythm: Normal rate and regular rhythm.      Heart sounds: Normal heart sounds. No " murmur.   Pulmonary:      Effort: Pulmonary effort is normal.      Breath sounds: Normal breath sounds. No wheezing.   Musculoskeletal:      Comments: Toenail of left great toe is curling under both sides digging into the soft tissues.  It is moderately tender to palpation.  No discharge.  No induration, swelling, or notable erythema as compared to the right great toe.  Normal gait.  Sensation intact.  Cap refill less than 2 seconds.   Skin:     General: Skin is warm and dry.      Capillary Refill: Capillary refill takes less than 2 seconds.   Neurological:      Mental Status: She is alert and oriented to person, place, and time.   Psychiatric:         Behavior: Behavior normal.         Judgment: Judgment normal.            Assessment/Plan:     1. Ingrown toenail of left foot  - sulfamethoxazole-trimethoprim (BACTRIM DS) 800-160 MG tablet; Take 1 Tab by mouth 2 times a day for 5 days.  Dispense: 10 Tab; Refill: 0  *Patient has ingrown toenail but on exam today do not see any evidence of infection.  Patient has highly concerned for sepsis, however.  Patient was given a contingent antibiotic prescription to fill and use as directed if symptoms progressed as discussed and agreed upon.  Discussed that she needs to so soaks with warm water and Epson salt multiple times per day and follow-up with podiatry.  Infection precautions discussed.        Differential Diagnosis, natural history, and supportive care discussed. Return to the Urgent Care or follow up with your PCP if symptoms fail to resolve, or for any new or worsening symptoms. Emergency room precautions discussed. Patient and/or family appears understanding of information.

## 2020-08-10 ENCOUNTER — APPOINTMENT (OUTPATIENT)
Dept: MEDICAL GROUP | Facility: MEDICAL CENTER | Age: 70
End: 2020-08-10
Payer: MEDICARE

## 2020-08-11 ENCOUNTER — OFFICE VISIT (OUTPATIENT)
Dept: MEDICAL GROUP | Facility: MEDICAL CENTER | Age: 70
End: 2020-08-11
Payer: MEDICARE

## 2020-08-11 VITALS
HEART RATE: 104 BPM | RESPIRATION RATE: 16 BRPM | OXYGEN SATURATION: 94 % | WEIGHT: 122.14 LBS | HEIGHT: 65 IN | TEMPERATURE: 99.4 F | BODY MASS INDEX: 20.35 KG/M2 | SYSTOLIC BLOOD PRESSURE: 108 MMHG | DIASTOLIC BLOOD PRESSURE: 62 MMHG

## 2020-08-11 DIAGNOSIS — E55.9 VITAMIN D DEFICIENCY: ICD-10-CM

## 2020-08-11 DIAGNOSIS — E04.2 MULTIPLE THYROID NODULES: ICD-10-CM

## 2020-08-11 DIAGNOSIS — M85.852 OSTEOPENIA OF LEFT HIP: ICD-10-CM

## 2020-08-11 DIAGNOSIS — I83.93 VARICOSE VEINS OF BOTH LOWER EXTREMITIES, UNSPECIFIED WHETHER COMPLICATED: ICD-10-CM

## 2020-08-11 DIAGNOSIS — E06.3 HASHIMOTO'S DISEASE: ICD-10-CM

## 2020-08-11 PROCEDURE — 99214 OFFICE O/P EST MOD 30 MIN: CPT | Performed by: INTERNAL MEDICINE

## 2020-08-11 ASSESSMENT — FIBROSIS 4 INDEX: FIB4 SCORE: 1.355261854357876857
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
## 2020-10-08 ENCOUNTER — OFFICE VISIT (OUTPATIENT)
Dept: URGENT CARE | Facility: CLINIC | Age: 70
End: 2020-10-08
Payer: MEDICARE

## 2020-10-08 VITALS
TEMPERATURE: 99.5 F | OXYGEN SATURATION: 95 % | WEIGHT: 125 LBS | HEART RATE: 84 BPM | DIASTOLIC BLOOD PRESSURE: 66 MMHG | RESPIRATION RATE: 16 BRPM | SYSTOLIC BLOOD PRESSURE: 108 MMHG | BODY MASS INDEX: 20.83 KG/M2 | HEIGHT: 65 IN

## 2020-10-08 DIAGNOSIS — K04.7 DENTAL ABSCESS: Primary | ICD-10-CM

## 2020-10-08 PROCEDURE — 99214 OFFICE O/P EST MOD 30 MIN: CPT | Performed by: PHYSICIAN ASSISTANT

## 2020-10-08 RX ORDER — CLARITHROMYCIN 500 MG/1
500 TABLET, COATED ORAL 2 TIMES DAILY
Qty: 14 TAB | Refills: 0 | Status: SHIPPED | OUTPATIENT
Start: 2020-10-08 | End: 2020-10-15

## 2020-10-08 RX ORDER — LEVOTHYROXINE SODIUM 88 UG/1
TABLET ORAL
COMMUNITY
Start: 2020-08-23 | End: 2020-11-16

## 2020-10-08 RX ORDER — AZITHROMYCIN 250 MG/1
TABLET, FILM COATED ORAL
COMMUNITY
Start: 2020-09-08 | End: 2020-10-08

## 2020-10-08 RX ORDER — CLINDAMYCIN HYDROCHLORIDE 300 MG/1
CAPSULE ORAL
COMMUNITY
Start: 2020-09-02 | End: 2020-10-08

## 2020-10-08 RX ORDER — HYDROCODONE BITARTRATE AND ACETAMINOPHEN 5; 325 MG/1; MG/1
TABLET ORAL
COMMUNITY
Start: 2020-09-11 | End: 2021-03-15

## 2020-10-08 ASSESSMENT — FIBROSIS 4 INDEX: FIB4 SCORE: 1.355261854357876857

## 2020-10-08 NOTE — PATIENT INSTRUCTIONS
Dental Abscess    A dental abscess is an area of pus in or around a tooth. It comes from an infection. It can cause pain and other symptoms. Treatment will help with symptoms and prevent the infection from spreading.  Follow these instructions at home:  Medicines  · Take over-the-counter and prescription medicines only as told by your dentist.  · If you were prescribed an antibiotic medicine, take it as told by your dentist. Do not stop taking it even if you start to feel better.  · If you were prescribed a gel that has numbing medicine in it, use it exactly as told.  · Do not drive or use heavy machinery (like a ) while taking prescription pain medicine.  General instructions  · Rinse out your mouth often with salt water.  ? To make salt water, dissolve ½-1 tsp of salt in 1 cup of warm water.  · Eat a soft diet while your mouth is healing.  · Drink enough fluid to keep your urine pale yellow.  · Do not apply heat to the outside of your mouth.  · Do not use any products that contain nicotine or tobacco. These include cigarettes and e-cigarettes. If you need help quitting, ask your doctor.  · Keep all follow-up visits as told by your dentist. This is important.  Prevent an abscess  · Brush your teeth every morning and every night. Use fluoride toothpaste.  · Floss your teeth each day.  · Get dental cleanings as often as told by your dentist.  · Think about getting dental sealant put on teeth that have deep holes (decay).  · Drink water that has fluoride in it.  ? Most tap water has fluoride.  ? Check the label on bottled water to see if it has fluoride in it.  · Drink water instead of sugary drinks.  · Eat healthy meals and snacks.  · Wear a mouth guard or face shield when you play sports.  Contact a doctor if:  · Your pain is worse, and medicine does not help.  Get help right away if:  · You have a fever or chills.  · Your symptoms suddenly get worse.  · You have a very bad headache.  · You have problems  breathing or swallowing.  · You have trouble opening your mouth.  · You have swelling in your neck or close to your eye.  Summary  · A dental abscess is an area of pus in or around a tooth. It is caused by an infection.  · Treatment will help with symptoms and prevent the infection from spreading.  · Take over-the-counter and prescription medicines only as told by your dentist.  · To prevent an abscess, take good care of your teeth. Brush your teeth every morning and night. Use floss every day.  · Get dental cleanings as often as told by your dentist.  This information is not intended to replace advice given to you by your health care provider. Make sure you discuss any questions you have with your health care provider.  Document Released: 05/03/2016 Document Revised: 04/08/2020 Document Reviewed: 08/20/2018  Elsevier Patient Education © 2020 [employer]

## 2020-10-08 NOTE — PROGRESS NOTES
Subjective:      Pt is a 70 y.o. female who presents with Dental Pain            HPI  This is a new problem. Pt notes tooth pain and dental abscess x 2 days. Pt tried to see dentist, Cecil Mata DDS but can only get and appt this coming Monday. Pt has not taken any Rx medications for this condition. Pt states the pain is a 7/10, aching in nature and worse at night. Pt denies CP, SOB, NVD, paresthesias, headaches, dizziness, change in vision, hives, or other joint pain. The pt's medication list, problem list, and allergies have been evaluated and reviewed during today's visit.    PMH:  Past Medical History:   Diagnosis Date   • Anxiety    • Arthritis    • Back pain    • Bleeding from the nose    • Cancer (HCC)     hpv cervical   • Conjunctivitis    • COPD (chronic obstructive pulmonary disease) (HCC)    • COPD (chronic obstructive pulmonary disease) (HCC)    • Hay fever    • Hemorrhoids    • Hypothyroid     Hashimoto's   • Kidney disease    • Measles    • Mumps    • Pneumonia    • Sinusitis    • Urinary tract infection    • Venereal disease        PSH:  Past Surgical History:   Procedure Laterality Date   • HYSTERECTOMY ROBOTIC  08    Performed by MARIANELA LAZO at SURGERY [facility] ORS   • CERVICAL CONIZATION  08    Performed by SEKOU PEDERSEN at SURGERY SAME DAY [facility] ORS   • ABDOMINAL HYSTERECTOMY TOTAL         • KNEE ORIF      right   • NASAL POLYPECTOMY     • RECTAL POLYPECTOMY     • TONSILLECTOMY     • TUBAL LIGATION         Fam Hx:    family history includes Allergies in her daughter and son; Cancer in her daughter, father, and mother; Diabetes in her son; Heart Disease in her maternal grandfather; Lung Disease in her father and mother; Other in her daughter; Thyroid in her daughter.  Family Status   Relation Name Status   • Mo     • Fa     • Sis 1 Alive   • MGFa  (Not Specified)   • Bienvenido  Alive   • Son  Alive       Soc HX:  Social History     Socioeconomic History   • Marital  status:      Spouse name: Not on file   • Number of children: Not on file   • Years of education: Not on file   • Highest education level: Not on file   Occupational History   • Not on file   Social Needs   • Financial resource strain: Not on file   • Food insecurity     Worry: Not on file     Inability: Not on file   • Transportation needs     Medical: Not on file     Non-medical: Not on file   Tobacco Use   • Smoking status: Current Every Day Smoker     Packs/day: 1.00     Years: 50.00     Pack years: 50.00     Types: Cigarettes   • Smokeless tobacco: Never Used   Substance and Sexual Activity   • Alcohol use: No   • Drug use: No   • Sexual activity: Not Currently     Partners: Male     Birth control/protection: Other-See Comments     Comment: none   Lifestyle   • Physical activity     Days per week: Not on file     Minutes per session: Not on file   • Stress: Not on file   Relationships   • Social connections     Talks on phone: Not on file     Gets together: Not on file     Attends Faith service: Not on file     Active member of club or organization: Not on file     Attends meetings of clubs or organizations: Not on file     Relationship status: Not on file   • Intimate partner violence     Fear of current or ex partner: Not on file     Emotionally abused: Not on file     Physically abused: Not on file     Forced sexual activity: Not on file   Other Topics Concern   • Not on file   Social History Narrative   • Not on file         Medications:    Current Outpatient Medications:   •  LEVOXYL 88 MCG Tab, TK 1 T PO QAM ON AN EMPTY STOMACH FOR 90 DAYS, Disp: , Rfl:   •  clarithromycin (BIAXIN) 500 MG Tab, Take 1 Tab by mouth 2 times a day for 7 days., Disp: 14 Tab, Rfl: 0  •  loratadine (CLARITIN) 10 MG Tab, Take 1 Tab by mouth every day., Disp: 30 Tab, Rfl: 3  •  silver sulfADIAZINE (SILVADENE) 1 % Cream, , Disp: , Rfl:   •  HYDROcodone-acetaminophen (NORCO) 5-325 MG Tab per tablet, TK 1 T PO Q 4 TO 6 H  "PRF PAIN FOR 1 DAY, Disp: , Rfl:   •  vitamin D, Ergocalciferol, (DRISDOL) 1.25 MG (13416 UT) Cap capsule, Take 1 Cap by mouth every 7 days., Disp: 12 Cap, Rfl: 1  •  fluticasone (FLONASE) 50 MCG/ACT nasal spray, Spray 1 Spray in nose every day., Disp: 16 g, Rfl: 3      Allergies:  Tape, Augmentin, Ciprofloxacin, Neosporin plus pain, and Other misc    ROS  Constitutional: Negative for fever, chills and malaise/fatigue.   HENT: Negative for congestion and sore throat.  +tooth abscess  Eyes: Negative for blurred vision, double vision and photophobia.   Respiratory: Negative for cough and shortness of breath.  Cardiovascular: Negative for chest pain and palpitations.   Gastrointestinal: Negative for heartburn, nausea, vomiting, abdominal pain, diarrhea and constipation.   Genitourinary: Negative for dysuria and flank pain.   Musculoskeletal: Negative for joint pain and myalgias.   Skin: Negative for itching and rash.   Neurological: Negative for dizziness, tingling and headaches.   Endo/Heme/Allergies: Does not bruise/bleed easily.   Psychiatric/Behavioral: Negative for depression. The patient is not nervous/anxious.           Objective:     /66   Pulse 84   Temp 37.5 °C (99.5 °F) (Temporal)   Resp 16   Ht 1.651 m (5' 5\")   Wt 56.7 kg (125 lb)   LMP  (LMP Unknown)   SpO2 95%   BMI 20.80 kg/m²      Physical Exam  HENT:      Mouth/Throat:      Lips: Pink.      Mouth: Mucous membranes are moist. No injury, lacerations, oral lesions or angioedema.      Dentition: Abnormal dentition. Does not have dentures. Dental tenderness and dental caries present. No gingival swelling, dental abscesses or gum lesions.      Tongue: No lesions. Tongue does not deviate from midline.      Palate: No mass and lesions.      Pharynx: Oropharynx is clear. Uvula midline. No pharyngeal swelling, oropharyngeal exudate, posterior oropharyngeal erythema or uvula swelling.      Tonsils: No tonsillar exudate or tonsillar abscesses. "              Constitutional: PT is oriented to person, place, and time. PT appears well-developed and well-nourished. No distress.   HENT:   Head: Normocephalic and atraumatic.   Mouth/Throat: Oropharynx is clear and moist. No oropharyngeal exudate.   Eyes: Conjunctivae normal and EOM are normal. Pupils are equal, round, and reactive to light.   Neck: Normal range of motion. Neck supple. No thyromegaly present.   Cardiovascular: Normal rate, regular rhythm, normal heart sounds and intact distal pulses.  Exam reveals no gallop and no friction rub.    No murmur heard.  Pulmonary/Chest: Effort normal and breath sounds normal. No respiratory distress. PT has no wheezes. PT has no rales. Pt exhibits no tenderness.   Abdominal: Soft. Bowel sounds are normal. PT exhibits no distension and no mass. There is no tenderness. There is no rebound and no guarding.   Musculoskeletal: Normal range of motion. PT exhibits no edema and no tenderness.   Neurological: PT is alert and oriented to person, place, and time. PT has normal reflexes. No cranial nerve deficit.   Skin: Skin is warm and dry. No rash noted. PT is not diaphoretic. No erythema.       Psychiatric: PT has a normal mood and affect. PT behavior is normal. Judgment and thought content normal.        Assessment/Plan:        1. Dental abscess    - clarithromycin (BIAXIN) 500 MG Tab; Take 1 Tab by mouth 2 times a day for 7 days.  Dispense: 14 Tab; Refill: 0    Pt to see her dentist, Cecil Mata DDS this coming Monday, 9 am  Rest, fluids encouraged.  AVS with medical info given.  Pt was in full understanding and agreement with the plan.  Differential diagnosis, natural history, supportive care, and indications for immediate follow-up discussed. All questions answered. Patient agrees with the plan of care.  Follow-up as needed if symptoms worsen or fail to improve to PCP, Urgent care or Emergency Room.

## 2020-10-29 DIAGNOSIS — E55.9 VITAMIN D DEFICIENCY: ICD-10-CM

## 2020-10-29 RX ORDER — ERGOCALCIFEROL 1.25 MG/1
CAPSULE ORAL
Qty: 12 CAP | Refills: 1 | Status: SHIPPED | OUTPATIENT
Start: 2020-10-29 | End: 2021-03-10 | Stop reason: SDUPTHER

## 2020-10-29 NOTE — TELEPHONE ENCOUNTER
Received request via: Pharmacy    Was the patient seen in the last year in this department? Yes    Does the patient have an active prescription (recently filled or refills available) for medication(s) requested? No       vitamin D, Ergocalciferol, (DRISDOL) 1.25 MG (63306 UT) Cap capsule    Sig: TAKE 1 CAPSULE BY MOUTH EVERY 7 DAYS

## 2020-11-11 ENCOUNTER — APPOINTMENT (OUTPATIENT)
Dept: MEDICAL GROUP | Facility: MEDICAL CENTER | Age: 70
End: 2020-11-11
Payer: MEDICARE

## 2020-11-16 DIAGNOSIS — E03.8 HYPOTHYROIDISM DUE TO HASHIMOTO'S THYROIDITIS: ICD-10-CM

## 2020-11-16 DIAGNOSIS — E06.3 HYPOTHYROIDISM DUE TO HASHIMOTO'S THYROIDITIS: ICD-10-CM

## 2020-11-16 RX ORDER — LEVOTHYROXINE SODIUM 88 UG/1
TABLET ORAL
Qty: 90 TAB | Refills: 0 | Status: SHIPPED | OUTPATIENT
Start: 2020-11-16 | End: 2021-02-11

## 2021-02-06 ENCOUNTER — OFFICE VISIT (OUTPATIENT)
Dept: URGENT CARE | Facility: CLINIC | Age: 71
End: 2021-02-06
Payer: MEDICARE

## 2021-02-06 VITALS
TEMPERATURE: 98.3 F | RESPIRATION RATE: 16 BRPM | SYSTOLIC BLOOD PRESSURE: 116 MMHG | HEART RATE: 86 BPM | DIASTOLIC BLOOD PRESSURE: 78 MMHG | HEIGHT: 65 IN | WEIGHT: 114 LBS | OXYGEN SATURATION: 96 % | BODY MASS INDEX: 18.99 KG/M2

## 2021-02-06 DIAGNOSIS — K08.89 PAIN, DENTAL: ICD-10-CM

## 2021-02-06 PROCEDURE — 99214 OFFICE O/P EST MOD 30 MIN: CPT | Performed by: PHYSICIAN ASSISTANT

## 2021-02-06 RX ORDER — CLARITHROMYCIN 500 MG/1
500 TABLET, COATED ORAL 2 TIMES DAILY
Qty: 14 TAB | Refills: 0 | Status: SHIPPED | OUTPATIENT
Start: 2021-02-06 | End: 2021-02-13

## 2021-02-06 ASSESSMENT — ENCOUNTER SYMPTOMS
ABDOMINAL PAIN: 0
SENSORY CHANGE: 0
COUGH: 0
VOMITING: 0
CHILLS: 0
TINGLING: 0
MYALGIAS: 0
FOCAL WEAKNESS: 0
FEVER: 0
SINUS PRESSURE: 0
HEADACHES: 0
NAUSEA: 0
DIZZINESS: 0

## 2021-02-06 ASSESSMENT — FIBROSIS 4 INDEX: FIB4 SCORE: 1.355261854357876857

## 2021-02-06 NOTE — PROGRESS NOTES
Subjective:      Guerda Boateng is a 70 y.o. female who presents with Dental Pain (upper rt third from the back x1 month )            Dental Pain   This is a recurrent problem. Episode onset: Recurrent dental pain- recent onset 1 month ago. Right upper tooth. Has history of recurrent abscesses. She is requesting an antibiotic. Has scheduled dental appt next week. The problem occurs constantly. The problem has been gradually worsening. The pain is moderate. Pertinent negatives include no difficulty swallowing, facial pain, fever, oral bleeding, sinus pressure or thermal sensitivity. She has tried acetaminophen for the symptoms. The treatment provided moderate relief.       Past Medical History:   Diagnosis Date   • Anxiety    • Arthritis    • Back pain    • Bleeding from the nose    • Cancer (HCC)     hpv cervical   • Conjunctivitis    • COPD (chronic obstructive pulmonary disease) (HCC)    • COPD (chronic obstructive pulmonary disease) (HCC)    • Hay fever    • Hemorrhoids    • Hypothyroid     Hashimoto's   • Kidney disease    • Measles    • Mumps    • Pneumonia    • Sinusitis    • Urinary tract infection    • Venereal disease        Past Surgical History:   Procedure Laterality Date   • HYSTERECTOMY ROBOTIC  11/6/08    Performed by MARIANELA LAZO at SURGERY [facility] ORS   • CERVICAL CONIZATION  8/8/08    Performed by SEKOU PEDERSEN at SURGERY SAME DAY [facility] ORS   • ABDOMINAL HYSTERECTOMY TOTAL      2008   • KNEE ORIF      right   • NASAL POLYPECTOMY     • RECTAL POLYPECTOMY     • TONSILLECTOMY     • TUBAL LIGATION         Family History   Problem Relation Age of Onset   • Cancer Mother         lung   • Lung Disease Mother    • Cancer Father         lung   • Lung Disease Father    • Heart Disease Maternal Grandfather    • Other Daughter         gout   • Thyroid Daughter    • Allergies Daughter    • Cancer Daughter         thyroid   • Diabetes Son    • Allergies Son        Allergies   Allergen Reactions   "  • Tape Itching     Adhesive tape   • Augmentin Itching   • Ciprofloxacin Shortness of Breath   • Neosporin Plus Pain    • Other Misc      Formaldehyde  Weed killer  Leather glue       Medications, Allergies, and current problem list reviewed today in Epic    Review of Systems   Constitutional: Negative for chills, fever and malaise/fatigue.   HENT: Negative for sinus pressure.         Dental pain- right upper teeth    Respiratory: Negative for cough.    Gastrointestinal: Negative for abdominal pain, nausea and vomiting.   Musculoskeletal: Negative for myalgias.   Neurological: Negative for dizziness, tingling, sensory change, focal weakness and headaches.     All other systems reviewed and are negative.        Objective:     /78   Pulse 86   Temp 36.8 °C (98.3 °F) (Temporal)   Resp 16   Ht 1.651 m (5' 5\")   Wt 51.7 kg (114 lb)   LMP  (LMP Unknown)   SpO2 96%   BMI 18.97 kg/m²      Physical Exam  Constitutional:       General: She is not in acute distress.     Appearance: She is not ill-appearing.   HENT:      Head: Normocephalic and atraumatic.      Mouth/Throat:      Mouth: Mucous membranes are moist.      Dentition: Normal dentition. No dental abscesses.      Pharynx: Oropharynx is clear.     Eyes:      Conjunctiva/sclera: Conjunctivae normal.   Cardiovascular:      Rate and Rhythm: Normal rate.   Pulmonary:      Effort: Pulmonary effort is normal. No respiratory distress.   Lymphadenopathy:      Cervical: No cervical adenopathy.   Skin:     General: Skin is warm and dry.   Neurological:      General: No focal deficit present.      Mental Status: She is alert and oriented to person, place, and time.   Psychiatric:         Mood and Affect: Mood normal.         Behavior: Behavior normal.         Thought Content: Thought content normal.         Judgment: Judgment normal.                 Assessment/Plan:        1. Pain, dental    - clarithromycin (BIAXIN) 500 MG Tab; Take 1 Tab by mouth 2 times a day " for 7 days.  Dispense: 14 Tab; Refill: 0    Follow-up with dentist.    Differential diagnoses, Supportive care, and indications for immediate follow-up discussed with patient.   Pathogenesis of diagnosis discussed including typical length and natural progression.   Instructed to return to clinic or nearest emergency department for any change in condition, further concerns, or worsening of symptoms.    The patient demonstrated a good understanding and agreed with the treatment plan.    Adelaida Macias P.A.-C.

## 2021-02-19 ENCOUNTER — TELEPHONE (OUTPATIENT)
Dept: ENDOCRINOLOGY | Facility: MEDICAL CENTER | Age: 71
End: 2021-02-19

## 2021-02-19 NOTE — TELEPHONE ENCOUNTER
Patient is requesting f/v labs for her upcoming appt on 3/10/21. Please contact patient once ordered, she will go to a [facility] lab.    Patient is aware there are labs that have been ordered by , but needs the following ordered as well:    KENNETH   Vit B12  Folate PT  [place]um Iron    [phone]

## 2021-02-24 ENCOUNTER — TELEPHONE (OUTPATIENT)
Dept: MEDICAL GROUP | Facility: MEDICAL CENTER | Age: 71
End: 2021-02-24

## 2021-03-03 ENCOUNTER — OFFICE VISIT (OUTPATIENT)
Dept: MEDICAL GROUP | Facility: MEDICAL CENTER | Age: 71
End: 2021-03-03
Payer: MEDICARE

## 2021-03-03 VITALS
DIASTOLIC BLOOD PRESSURE: 78 MMHG | BODY MASS INDEX: 18.76 KG/M2 | WEIGHT: 112.6 LBS | HEART RATE: 70 BPM | HEIGHT: 65 IN | SYSTOLIC BLOOD PRESSURE: 120 MMHG | RESPIRATION RATE: 12 BRPM | TEMPERATURE: 97.1 F | OXYGEN SATURATION: 96 %

## 2021-03-03 DIAGNOSIS — Z72.0 TOBACCO USE: ICD-10-CM

## 2021-03-03 DIAGNOSIS — Z12.31 ENCOUNTER FOR SCREENING MAMMOGRAM FOR MALIGNANT NEOPLASM OF BREAST: ICD-10-CM

## 2021-03-03 DIAGNOSIS — J44.9 CHRONIC OBSTRUCTIVE PULMONARY DISEASE, UNSPECIFIED COPD TYPE (HCC): ICD-10-CM

## 2021-03-03 DIAGNOSIS — M79.606 PAIN OF LOWER EXTREMITY, UNSPECIFIED LATERALITY: ICD-10-CM

## 2021-03-03 DIAGNOSIS — E06.3 HASHIMOTO'S DISEASE: ICD-10-CM

## 2021-03-03 DIAGNOSIS — H04.129 DRY EYE: ICD-10-CM

## 2021-03-03 DIAGNOSIS — R73.03 PRE-DIABETES: ICD-10-CM

## 2021-03-03 DIAGNOSIS — Z23 NEED FOR VACCINATION: ICD-10-CM

## 2021-03-03 DIAGNOSIS — R63.4 WEIGHT LOSS: ICD-10-CM

## 2021-03-03 DIAGNOSIS — E78.5 DYSLIPIDEMIA: ICD-10-CM

## 2021-03-03 PROCEDURE — 99214 OFFICE O/P EST MOD 30 MIN: CPT | Performed by: INTERNAL MEDICINE

## 2021-03-03 RX ORDER — MELOXICAM 7.5 MG/1
7.5 TABLET ORAL DAILY
Qty: 30 TABLET | Refills: 3 | Status: SHIPPED | OUTPATIENT
Start: 2021-03-03 | End: 2021-03-15

## 2021-03-03 ASSESSMENT — FIBROSIS 4 INDEX: FIB4 SCORE: 1.355261854357876857

## 2021-03-03 ASSESSMENT — PATIENT HEALTH QUESTIONNAIRE - PHQ9: CLINICAL INTERPRETATION OF PHQ2 SCORE: 0

## 2021-03-03 NOTE — PROGRESS NOTES
"CC:  Diagnoses of Weight loss, Encounter for screening mammogram for malignant neoplasm of breast, Hashimoto's disease, Pre-diabetes, Dyslipidemia, Chronic obstructive pulmonary disease, unspecified COPD type (HCC), Pain of lower extremity, unspecified laterality, Tobacco use, and Dry eye were pertinent to this visit.    HISTORY OF THE PRESENT ILLNESS: Patient is a 70 y.o. female. This pleasant patient is here today for follow-up.    She is concerned that weight loss going from 141 pounds down to 112.  She says she feels in general \"inflamed.  \"    Does have history of thyroid disease, sees her endocrinologist she reports in about 1 week and will get her labs done for thyroid prior to that.    Tells me the podiatrist removed her toenails and this is a long recovery and she had shooting pain up her legs for many months.  She also says she is had multiple dental abscesses and related issues of multiple dental extractions.  This makes her feel inflamed.  She also says she thinks that she has Sjogren's.  Dry eyes, likely contributing to some of her eye symptoms of discomfort/burning at this time.    Does also have a lot of underlying anxiety, she appeared very anxious in clinic today.  Still has some benzodiazepine pills left from her remote prescription.  Offered to prescribe her Cymbalta for anxiety and pain but she declines.  She is taking over-the-counter NSAIDs willing to change this to meloxicam.    Regarding the weight loss she denies any fever, chills, night sweats.  No focal lymphadenopathy.  Denies any pulmonary symptoms such as hemoptysis, wheeze, cough, dyspnea.  Denies any chest pain or symptoms of volume overload.  Denies any GI symptoms such as rectal bleeding, pain with eating, change in bowel movements, abdominal pain, etc.  She does admit to not eating much after her dental extractions but she does not feel this is because of her weight loss.  She also cut out ice cream and other unhealthy foods over " the same timeframe.    Allergies: Tape, Augmentin, Ciprofloxacin, Neosporin plus pain, and Other misc    Current Outpatient Medications Ordered in Epic   Medication Sig Dispense Refill   • meloxicam (MOBIC) 7.5 MG Tab Take 1 tablet by mouth every day. 30 tablet 3   • LEVOXYL 88 MCG Tab TAKE 1 TABLET BY MOUTH EVERY MORNING ON AN EMPTY STOMACH 30 tablet 0   • vitamin D, Ergocalciferol, (DRISDOL) 1.25 MG (44396 UT) Cap capsule TAKE 1 CAPSULE BY MOUTH EVERY 7 DAYS 12 Cap 1   • silver sulfADIAZINE (SILVADENE) 1 % Cream      • HYDROcodone-acetaminophen (NORCO) 5-325 MG Tab per tablet TK 1 T PO Q 4 TO 6 H PRF PAIN FOR 1 DAY       No current [facility]-ordered facility-administered medications on file.       Past Medical History:   Diagnosis Date   • Anxiety    • Arthritis    • Back pain    • Bleeding from the nose    • Cancer (HCC)     hpv cervical   • Conjunctivitis    • COPD (chronic obstructive pulmonary disease) (HCC)    • COPD (chronic obstructive pulmonary disease) (HCC)    • Hay fever    • Hemorrhoids    • Hypothyroid     Hashimoto's   • Kidney disease    • Measles    • Mumps    • Pneumonia    • Sinusitis    • Urinary tract infection    • Venereal disease        Past Surgical History:   Procedure Laterality Date   • HYSTERECTOMY ROBOTIC  11/6/08    Performed by MARIANELA LAZO at SURGERY [facility] ORS   • CERVICAL CONIZATION  8/8/08    Performed by SEKOU PEDERSEN at SURGERY SAME DAY [facility] ORS   • ABDOMINAL HYSTERECTOMY TOTAL      2008   • KNEE ORIF      right   • NASAL POLYPECTOMY     • RECTAL POLYPECTOMY     • TONSILLECTOMY     • TUBAL LIGATION         Social History     Tobacco Use   • Smoking status: Current Every Day Smoker     Packs/day: 1.00     Years: 50.00     Pack years: 50.00     Types: Cigarettes   • Smokeless tobacco: Never Used   Substance Use Topics   • Alcohol use: No   • Drug use: No       Social History     Social History Narrative   • Not on file       Family History   Problem Relation Age of Onset  "  • Cancer Mother         lung   • Lung Disease Mother    • Cancer Father         lung   • Lung Disease Father    • Heart Disease Maternal Grandfather    • Other Daughter         gout   • Thyroid Daughter    • Allergies Daughter    • Cancer Daughter         thyroid   • Diabetes Son    • Allergies Son        Exam: /78 (BP Location: Left arm, Patient Position: Sitting)   Pulse 70   Temp 36.2 °C (97.1 °F) (Temporal)   Resp 12   Ht 1.651 m (5' 5\")   Wt 51.1 kg (112 lb 9.6 oz)   SpO2 96%  Body mass index is 18.74 kg/m².    General: Normal appearing. No distress.  EYES: Conjunctiva clear lids without ptosis, pupils equal  EARS: Normal shape and contour   Pulmonary: Clear to ausculation.  Normal effort. No rales or wheezing.  Cardiovascular: Regular rate and rhythm without significant murmur.   Abdomen: Soft, nontender, nondistended. Normal bowel sounds.  Neurologic: Cranial nerves grossly nonfocal  Skin: Warm and dry.  No obvious lesions.  Musculoskeletal: Normal gait. No extremity cyanosis, clubbing, or edema.  Psych: Normal mood and affect. Alert and oriented x3. Judgment and insight is normal.      Assessment/Plan  1. Weight loss  Probably due to change in diet as well as poor intake with her dental pain.  She has a 50-year history of smoking and continues to smoke, declines smoking cessation.  She also declines mammogram for breast cancer screening.  I discussed with her she needs to be more involved in her health care and listen to her medical advice.  Particularly for preventive health measures especially if she is worried about potential for cancer with her weight loss.  Still she declines to quit smoking.  Reasonable do pan scan.  But I feel her weight loss is due to her change in oral intake.  Pending thyroid labs to rule out this.  - CBC WITH DIFFERENTIAL; Future  - CT-CHEST (THORAX) WITH; Future  - CT-ABDOMEN-PELVIS WITH; Future  - REFERRAL TO NUTRITION SERVICES  - KENNETH IGG APRIL W/RFLX TO KENNETH IGG " IFA; Future    2. Encounter for screening mammogram for malignant neoplasm of breast  - MA-SCREENING MAMMO BILAT W/TOMOSYNTHESIS W/CAD; Future    3. Hashimoto's disease  Pending TSH lab and endocrine follow-up.    4. Pre-diabetes  Plan reassess.  - HEMOGLOBIN A1C; Future    5. Dyslipidemia  Plan to reassess.  Can discuss with the patient again if she is willing to take a statin.  - Lipid Profile; Future    6. Chronic obstructive pulmonary disease, unspecified COPD type (HCC)  Denies any pulmonary symptoms that required inhaler.  Declined smoking cessation.    7. Pain of lower extremity, unspecified laterality  Pain started after toenail removal remotely.  She will not mix meloxicam with other NSAIDs.  She declines duloxetine which actually may benefit her pain and anxiety.  - meloxicam (MOBIC) 7.5 MG Tab; Take 1 tablet by mouth every day.  Dispense: 30 tablet; Refill: 3  - Sed Rate; Future  - KENNETH IGG APRIL W/RFLX TO KENNETH IGG IFA; Future    8. Tobacco use  Discussion above  - CT-CHEST (THORAX) WITH; Future    9. Dry eye  Reasonable to assess for Sjogren's further  - Sed Rate; Future  - SSA 52 AND 60 (RO)(CARIN) AB, IGG; Future  - KENNETH IGG APRIL W/RFLX TO KENNETH IGG IFA; Future    rtc 1m          Please note that this dictation was created using voice recognition software. I have made every reasonable attempt to correct obvious errors, but I expect that there are errors of grammar and possibly content that I did not discover before finalizing the note.

## 2021-03-04 ENCOUNTER — HOSPITAL ENCOUNTER (OUTPATIENT)
Dept: LAB | Facility: MEDICAL CENTER | Age: 71
End: 2021-03-04
Attending: INTERNAL MEDICINE
Payer: MEDICARE

## 2021-03-04 DIAGNOSIS — E06.3 HASHIMOTO'S DISEASE: ICD-10-CM

## 2021-03-04 DIAGNOSIS — E03.9 PRIMARY HYPOTHYROIDISM: ICD-10-CM

## 2021-03-04 DIAGNOSIS — E78.5 DYSLIPIDEMIA: ICD-10-CM

## 2021-03-04 DIAGNOSIS — E04.2 MULTIPLE THYROID NODULES: ICD-10-CM

## 2021-03-04 DIAGNOSIS — M79.606 PAIN OF LOWER EXTREMITY, UNSPECIFIED LATERALITY: ICD-10-CM

## 2021-03-04 DIAGNOSIS — H04.129 DRY EYE: ICD-10-CM

## 2021-03-04 DIAGNOSIS — R73.03 PRE-DIABETES: ICD-10-CM

## 2021-03-04 DIAGNOSIS — R63.4 WEIGHT LOSS: ICD-10-CM

## 2021-03-04 LAB
25(OH)D3 SERPL-MCNC: 76 NG/ML (ref 30–100)
ALBUMIN SERPL BCP-MCNC: 4.7 G/DL (ref 3.2–4.9)
ALBUMIN/GLOB SERPL: 1.9 G/DL
ALP SERPL-CCNC: 76 U/L (ref 30–99)
ALT SERPL-CCNC: 10 U/L (ref 2–50)
ANION GAP SERPL CALC-SCNC: 11 MMOL/L (ref 7–16)
AST SERPL-CCNC: 16 U/L (ref 12–45)
BASOPHILS # BLD AUTO: 1.5 % (ref 0–1.8)
BASOPHILS # BLD: 0.1 K/UL (ref 0–0.12)
BILIRUB SERPL-MCNC: 0.7 MG/DL (ref 0.1–1.5)
BUN SERPL-MCNC: 21 MG/DL (ref 8–22)
CALCIUM SERPL-MCNC: 9.6 MG/DL (ref 8.4–10.2)
CHLORIDE SERPL-SCNC: 100 MMOL/L (ref 96–112)
CHOLEST SERPL-MCNC: 195 MG/DL (ref 100–199)
CO2 SERPL-SCNC: 27 MMOL/L (ref 20–33)
CREAT SERPL-MCNC: 0.79 MG/DL (ref 0.5–1.4)
EOSINOPHIL # BLD AUTO: 0.29 K/UL (ref 0–0.51)
EOSINOPHIL NFR BLD: 4.2 % (ref 0–6.9)
ERYTHROCYTE [DISTWIDTH] IN BLOOD BY AUTOMATED COUNT: 45.9 FL (ref 35.9–50)
ERYTHROCYTE [SEDIMENTATION RATE] IN BLOOD BY WESTERGREN METHOD: 0 MM/HOUR (ref 0–30)
FASTING STATUS PATIENT QL REPORTED: NORMAL
FASTING STATUS PATIENT QL REPORTED: NORMAL
GLOBULIN SER CALC-MCNC: 2.5 G/DL (ref 1.9–3.5)
GLUCOSE SERPL-MCNC: 129 MG/DL (ref 65–99)
HCT VFR BLD AUTO: 48.8 % (ref 37–47)
HDLC SERPL-MCNC: 77 MG/DL
HGB BLD-MCNC: 16.1 G/DL (ref 12–16)
IMM GRANULOCYTES # BLD AUTO: 0.02 K/UL (ref 0–0.11)
IMM GRANULOCYTES NFR BLD AUTO: 0.3 % (ref 0–0.9)
LDLC SERPL CALC-MCNC: 97 MG/DL
LYMPHOCYTES # BLD AUTO: 1.64 K/UL (ref 1–4.8)
LYMPHOCYTES NFR BLD: 23.9 % (ref 22–41)
MCH RBC QN AUTO: 31.9 PG (ref 27–33)
MCHC RBC AUTO-ENTMCNC: 33 G/DL (ref 33.6–35)
MCV RBC AUTO: 96.8 FL (ref 81.4–97.8)
MONOCYTES # BLD AUTO: 0.59 K/UL (ref 0–0.85)
MONOCYTES NFR BLD AUTO: 8.6 % (ref 0–13.4)
NEUTROPHILS # BLD AUTO: 4.23 K/UL (ref 2–7.15)
NEUTROPHILS NFR BLD: 61.5 % (ref 44–72)
NRBC # BLD AUTO: 0 K/UL
NRBC BLD-RTO: 0 /100 WBC
PLATELET # BLD AUTO: 269 K/UL (ref 164–446)
PMV BLD AUTO: 10.3 FL (ref 9–12.9)
POTASSIUM SERPL-SCNC: 4.7 MMOL/L (ref 3.6–5.5)
PROT SERPL-MCNC: 7.2 G/DL (ref 6–8.2)
PTH-INTACT SERPL-MCNC: 48.3 PG/ML (ref 14–72)
RBC # BLD AUTO: 5.04 M/UL (ref 4.2–5.4)
SODIUM SERPL-SCNC: 138 MMOL/L (ref 135–145)
T4 FREE SERPL-MCNC: 1.75 NG/DL (ref 0.93–1.7)
TRIGL SERPL-MCNC: 106 MG/DL (ref 0–149)
TSH SERPL DL<=0.005 MIU/L-ACNC: 0.72 UIU/ML (ref 0.38–5.33)
WBC # BLD AUTO: 6.9 K/UL (ref 4.8–10.8)

## 2021-03-04 PROCEDURE — 80061 LIPID PANEL: CPT

## 2021-03-04 PROCEDURE — 82306 VITAMIN D 25 HYDROXY: CPT | Mod: GA

## 2021-03-04 PROCEDURE — 86038 ANTINUCLEAR ANTIBODIES: CPT

## 2021-03-04 PROCEDURE — 86235 NUCLEAR ANTIGEN ANTIBODY: CPT

## 2021-03-04 PROCEDURE — 85025 COMPLETE CBC W/AUTO DIFF WBC: CPT

## 2021-03-04 PROCEDURE — 84443 ASSAY THYROID STIM HORMONE: CPT

## 2021-03-04 PROCEDURE — 83036 HEMOGLOBIN GLYCOSYLATED A1C: CPT | Mod: GA

## 2021-03-04 PROCEDURE — 84439 ASSAY OF FREE THYROXINE: CPT

## 2021-03-04 PROCEDURE — 85652 RBC SED RATE AUTOMATED: CPT

## 2021-03-04 PROCEDURE — 83970 ASSAY OF PARATHORMONE: CPT

## 2021-03-04 PROCEDURE — 80053 COMPREHEN METABOLIC PANEL: CPT

## 2021-03-04 PROCEDURE — 36415 COLL VENOUS BLD VENIPUNCTURE: CPT

## 2021-03-05 LAB
EST. AVERAGE GLUCOSE BLD GHB EST-MCNC: 114 MG/DL
HBA1C MFR BLD: 5.6 % (ref 4–5.6)

## 2021-03-06 LAB
NUCLEAR IGG SER QL IA: NORMAL
SSA52 R0ENA AB IGG Q0420: 4 AU/ML (ref 0–40)
SSA60 R0ENA AB IGG Q0419: 0 AU/ML (ref 0–40)

## 2021-03-07 ENCOUNTER — HOSPITAL ENCOUNTER (OUTPATIENT)
Dept: RADIOLOGY | Facility: MEDICAL CENTER | Age: 71
End: 2021-03-07
Attending: INTERNAL MEDICINE
Payer: MEDICARE

## 2021-03-07 DIAGNOSIS — Z72.0 TOBACCO USE: ICD-10-CM

## 2021-03-07 DIAGNOSIS — R63.4 WEIGHT LOSS: ICD-10-CM

## 2021-03-07 PROCEDURE — 71260 CT THORAX DX C+: CPT | Mod: MG

## 2021-03-07 PROCEDURE — 700117 HCHG RX CONTRAST REV CODE 255: Performed by: INTERNAL MEDICINE

## 2021-03-07 RX ADMIN — IOHEXOL 100 ML: 350 INJECTION, SOLUTION INTRAVENOUS at 13:25

## 2021-03-07 RX ADMIN — IOHEXOL 25 ML: 240 INJECTION, SOLUTION INTRATHECAL; INTRAVASCULAR; INTRAVENOUS; ORAL at 13:25

## 2021-03-09 ENCOUNTER — TELEPHONE (OUTPATIENT)
Dept: MEDICAL GROUP | Facility: MEDICAL CENTER | Age: 71
End: 2021-03-09

## 2021-03-10 ENCOUNTER — OFFICE VISIT (OUTPATIENT)
Dept: ENDOCRINOLOGY | Facility: MEDICAL CENTER | Age: 71
End: 2021-03-10
Attending: INTERNAL MEDICINE
Payer: MEDICARE

## 2021-03-10 VITALS
OXYGEN SATURATION: 94 % | HEART RATE: 68 BPM | HEIGHT: 65 IN | BODY MASS INDEX: 18.99 KG/M2 | WEIGHT: 114 LBS | DIASTOLIC BLOOD PRESSURE: 70 MMHG | SYSTOLIC BLOOD PRESSURE: 118 MMHG

## 2021-03-10 DIAGNOSIS — E06.3 HASHIMOTO'S DISEASE: ICD-10-CM

## 2021-03-10 DIAGNOSIS — E55.9 VITAMIN D DEFICIENCY: ICD-10-CM

## 2021-03-10 DIAGNOSIS — E03.9 PRIMARY HYPOTHYROIDISM: ICD-10-CM

## 2021-03-10 PROCEDURE — 99214 OFFICE O/P EST MOD 30 MIN: CPT | Performed by: INTERNAL MEDICINE

## 2021-03-10 PROCEDURE — 99211 OFF/OP EST MAY X REQ PHY/QHP: CPT | Performed by: INTERNAL MEDICINE

## 2021-03-10 RX ORDER — LEVOTHYROXINE SODIUM 88 UG/1
88 TABLET ORAL EVERY MORNING
Qty: 30 TABLET | Refills: 6 | Status: SHIPPED | OUTPATIENT
Start: 2021-03-10 | End: 2021-09-15 | Stop reason: SDUPTHER

## 2021-03-10 RX ORDER — ERGOCALCIFEROL 1.25 MG/1
50000 CAPSULE ORAL
Qty: 12 CAPSULE | Refills: 1 | Status: SHIPPED | OUTPATIENT
Start: 2021-03-10 | End: 2021-04-12

## 2021-03-10 ASSESSMENT — FIBROSIS 4 INDEX: FIB4 SCORE: 1.28

## 2021-03-10 NOTE — PROGRESS NOTES
Chief Complaint: Follow up for Primary Hypothyroidism, and D deficiency and history of thyroid nodule    HPI:     Guerda Boateng is a 69 y.o. female here for follow up of Primary Hypothyroidism.  Since last visit patient reports feeling excellent.  She remains on Levoxyl 88 MCG daily which has been her dose for the past 12 months.   She reports excellent compliance and denies missing any daily doses.   She takes thyroid hormone prior to breakfast.   She  denies taking any iron, calcium supplements or antacids.      Weight has been stable.      Her most recent TSH is optimal at 0.721 on March 4, 2021  Free T4 was 1.75      She reported weight loss and is anxious because she was diagnosed with a lung nodule on recent chest CT on March 2021       History of low vitamin D with baseline levels of 16 and 24 on June 2019 and May 2020 respectively.    Her vitamin D improved from 21 to 76  On 3/4/2021      Lastly she has a history of a hyperechoic 1 cm solid nodule on the right upper lobe which is benign per my read.  She denies a family history of thyroid cancer and explained her that biopsy is not indicated for this lesion  Radiology does not recommend any further follow-up on this lesion  Her neck exam today was unremarkable        Patient's medications, allergies, and social histories were reviewed and updated as appropriate.      ROS:     CONS:     No fever, no chills   EYES:     No diplopia, no blurry vision   CV:           No chest pain, no palpitations   PULM:     No SOB, no cough, no hemoptysis.   GI:            No nausea, no vomiting, no diarrhea, no constipation   ENDO:     No polyuria, no polydipsia, no heat intolerance, no cold intolerance       Past Medical History:  Problem List:  2021-03: Dyslipidemia  2020-08: Osteopenia of left hip  2020-05: Primary hypothyroidism  2020-05: Vitamin D deficiency  2019-05: Multiple thyroid nodules  2019-05: Anxiety  2019-05: Preventative health care  2019-04:  "Pre-diabetes  2019-04: Chest pain  2019-04: Hypokalemia  2019-01: Hashimoto's disease  2019-01: Tobacco dependence  2019-01: Vaginal itching  2019-01: COPD (chronic obstructive pulmonary disease) ([facility])      Past Surgical History:  Past Surgical History:   Procedure Laterality Date   • HYSTERECTOMY ROBOTIC  11/6/08    Performed by MARIANELA LAZO at SURGERY [facility] ORS   • CERVICAL CONIZATION  8/8/08    Performed by SEKOU PEDERSEN at SURGERY SAME DAY [facility] ORS   • ABDOMINAL HYSTERECTOMY TOTAL      2008   • KNEE ORIF      right   • NASAL POLYPECTOMY     • RECTAL POLYPECTOMY     • TONSILLECTOMY     • TUBAL LIGATION          Allergies:  Tape; Augmentin; Ciprofloxacin; and Other misc     Social History:  Social History     Tobacco Use   • Smoking status: Current Every Day Smoker     Packs/day: 1.00     Years: 50.00     Pack years: 50.00     Types: Cigarettes   • Smokeless tobacco: Never Used   Substance Use Topics   • Alcohol use: No   • Drug use: No        Family History:   family history includes Allergies in her daughter and son; Cancer in her daughter, father, and mother; Diabetes in her son; Heart Disease in her maternal grandfather; Lung Disease in her father and mother; Other in her daughter; Thyroid in her daughter.      PHYSICAL EXAM:   Vital signs: /70 (BP Location: Left arm, Patient Position: Sitting, BP Cuff Size: Adult)   Pulse 68   Ht 1.651 m (5' 5\")   Wt 51.7 kg (114 lb)   LMP  (LMP Unknown)   SpO2 94%   BMI 18.97 kg/m²   GENERAL: Well-developed, well-nourished in no apparent distress.   EYE:  No ocular asymmetry, PERRLA  HENT: Pink, moist mucous membranes.    NECK: No thyromegaly.  Palpable nodule in the right upper lobe  CARDIOVASCULAR:  No murmurs  LUNGS: Clear breath sounds  ABDOMEN: Soft, nontender   EXTREMITIES: No clubbing, cyanosis, or edema.   NEUROLOGICAL: No gross focal motor abnormalities   LYMPH: No cervical adenopathy palpated.   SKIN: No rashes, lesions.       Labs:  Lab " Results   Component Value Date/Time    SODIUM 138 03/04/2021 08:05 AM    POTASSIUM 4.7 03/04/2021 08:05 AM    CHLORIDE 100 03/04/2021 08:05 AM    CO2 27 03/04/2021 08:05 AM    ANION 11.0 03/04/2021 08:05 AM    GLUCOSE 129 (H) 03/04/2021 08:05 AM    BUN 21 03/04/2021 08:05 AM    CREATININE 0.79 03/04/2021 08:05 AM    CREATININE 0.81 05/10/2010 09:06 AM    CALCIUM 9.6 03/04/2021 08:05 AM    ASTSGOT 16 03/04/2021 08:05 AM    ALTSGPT 10 03/04/2021 08:05 AM    TBILIRUBIN 0.7 03/04/2021 08:05 AM    ALBUMIN 4.7 03/04/2021 08:05 AM    TOTPROTEIN 7.2 03/04/2021 08:05 AM    GLOBULIN 2.5 03/04/2021 08:05 AM    AGRATIO 1.9 03/04/2021 08:05 AM       Lab Results   Component Value Date/Time    SODIUM 143 05/13/2020 0947    POTASSIUM 4.8 05/13/2020 0947    CHLORIDE 105 05/13/2020 0947    CO2 25 05/13/2020 0947    GLUCOSE 111 (H) 05/13/2020 0947    BUN 20 05/13/2020 0947    CREATININE 0.72 05/13/2020 0947    CALCIUM 9.6 05/13/2020 0947    ANION 13.0 05/13/2020 0947       Lab Results   Component Value Date/Time    CHOLSTRLTOT 220 (H) 05/13/2020 0947    TRIGLYCERIDE 122 05/13/2020 0947    HDL 59 05/13/2020 0947     (H) 05/13/2020 0947       Lab Results   Component Value Date/Time    TSHULTRASEN 0.689 05/13/2020 0947     Lab Results   Component Value Date/Time    FREET4 1.88 (H) 05/13/2020 0947     Lab Results   Component Value Date/Time    FREET3 2.94 06/19/2019 0814     No results found for: THYSTIMIG    No results found for: MICROSOMALA      Imaging:      ASSESSMENT/PLAN:     1. Primary hypothyroidism  Fair control  TSH is normal but free T4 is low  She is anxious but this is more from her new diagnosis of lung nodule  Recommend adjusting Levoxyl to 88 daily and 1/2 pill on Sunday  Reviewed importance of adherence  We will plan for follow-up in 6 months with repeat of TSH    2. Hashimoto's disease  This is the etiology of her hypothyroidism    3. Vitamin D deficiency  Controlled  Continue ergocalciferol 50,000 units weekly  recommend repeating calcium 25-hydroxy vitamin D levels in 6 months      Return in about 6 months (around 9/10/2021).      Thank you kindly for allowing me to participate in the thyroid care plan for this patient.    Jose Miguel Jacobson MD, [facility]  05/18/20    CC:   Danika Carty M.D.

## 2021-03-15 ENCOUNTER — OFFICE VISIT (OUTPATIENT)
Dept: MEDICAL GROUP | Facility: MEDICAL CENTER | Age: 71
End: 2021-03-15
Payer: MEDICARE

## 2021-03-15 VITALS
SYSTOLIC BLOOD PRESSURE: 116 MMHG | WEIGHT: 114.42 LBS | HEART RATE: 61 BPM | RESPIRATION RATE: 18 BRPM | DIASTOLIC BLOOD PRESSURE: 88 MMHG | TEMPERATURE: 97.4 F | OXYGEN SATURATION: 94 % | BODY MASS INDEX: 19.06 KG/M2 | HEIGHT: 65 IN

## 2021-03-15 DIAGNOSIS — E04.2 MULTIPLE THYROID NODULES: ICD-10-CM

## 2021-03-15 DIAGNOSIS — F17.200 TOBACCO DEPENDENCE: ICD-10-CM

## 2021-03-15 DIAGNOSIS — E78.5 DYSLIPIDEMIA: ICD-10-CM

## 2021-03-15 DIAGNOSIS — D75.1 POLYCYTHEMIA: ICD-10-CM

## 2021-03-15 DIAGNOSIS — F41.9 ANXIETY: ICD-10-CM

## 2021-03-15 DIAGNOSIS — J44.9 CHRONIC OBSTRUCTIVE PULMONARY DISEASE, UNSPECIFIED COPD TYPE (HCC): ICD-10-CM

## 2021-03-15 DIAGNOSIS — E03.9 PRIMARY HYPOTHYROIDISM: ICD-10-CM

## 2021-03-15 DIAGNOSIS — R73.03 PRE-DIABETES: ICD-10-CM

## 2021-03-15 DIAGNOSIS — R63.4 WEIGHT LOSS: ICD-10-CM

## 2021-03-15 DIAGNOSIS — J43.2 CENTRILOBULAR EMPHYSEMA (HCC): ICD-10-CM

## 2021-03-15 DIAGNOSIS — R91.1 PULMONARY NODULE: ICD-10-CM

## 2021-03-15 PROCEDURE — 99214 OFFICE O/P EST MOD 30 MIN: CPT | Performed by: INTERNAL MEDICINE

## 2021-03-15 RX ORDER — HYDROXYZINE HYDROCHLORIDE 25 MG/1
25 TABLET, FILM COATED ORAL 3 TIMES DAILY PRN
Qty: 30 TABLET | Refills: 0 | Status: SHIPPED | OUTPATIENT
Start: 2021-03-15 | End: 2021-05-24

## 2021-03-15 RX ORDER — NICOTINE 21 MG/24HR
1 PATCH, TRANSDERMAL 24 HOURS TRANSDERMAL EVERY 24 HOURS
Qty: 30 PATCH | Refills: 1 | Status: SHIPPED | OUTPATIENT
Start: 2021-03-15 | End: 2021-05-24

## 2021-03-15 ASSESSMENT — FIBROSIS 4 INDEX: FIB4 SCORE: 1.28

## 2021-03-15 NOTE — PROGRESS NOTES
"CC:  Diagnoses of Multiple thyroid nodules, Primary hypothyroidism, Pre-diabetes, Chronic obstructive pulmonary disease, unspecified COPD type (HCC), Dyslipidemia, Weight loss, Tobacco dependence, Anxiety, Pulmonary nodule, Centrilobular emphysema (HCC), and Polycythemia were pertinent to this visit.    HISTORY OF THE PRESENT ILLNESS: Patient is a 70 y.o. female. This pleasant patient is here today to f/u.    Here for short follow-up as she was concerned about significant weight loss.  With her risk factors for malignancy CT pan scan was performed 3/7/2021 with no adenopathy found.  There was a 3 mm right upper lobe nodule and signs of centrilobular emphysema with fibrotic changes and pleural thickening.  She has not done her mammogram.  At this time she is agreeable to quit smoking.  She says she does not want any \"systemic \"medications which she describes as any oral medication such as Chantix.  She is agreeable to nicotine patch.  Weight is stable over the past 2 weeks and again she had changed her diet cutting out ice cream and decreased oral intake after dental work.  Still pending nutrition appointment to work on weight gain which is patient's request.    Endocrine note 3/10/2021 indicates that no further evaluation of the thyroid nodule is needed.  Her TSH was normal 0.71 with T4 little elevated 1.75 on labs from 3/4/2021 her Levoxyl dose was cut by half on Sunday.  She understands the thyroid could possibly be playing a role in her recent weight loss as well.  Lipids were significantly improved total cholesterol 195, triglycerides 106, HDL 77 LDL 87.  10-year ASCVD risk score is 10% and she denies any cardiopulmonary strokelike symptoms and also declines statin therapy.  With history impaired fasting glucose A1c had improved to 5.6.  She also felt \"inflamed \"throughout her body, dry eyes, etc. see last note, sed rate was 0, SSA/B normal and KENNETH also normal.  She does not wish to see ophthalmology for her dry " "eye complaint.  Likely arthritis in hands she does not wish to see occupational hand therapist, or related specialist.  She decided not to use meloxicam because she says she preferred \"praying \"as a treatment and she says that 1 week ago her pains in her mouth and hands stopped.    Recent labs also showed elevated hemoglobin 16.1, hematocrit 48.8.  She declines overnight oximetry for evaluation for occult sleep apnea.  Says remotely around 2000 she had microscopic hematuria and negative work-up for that at that time.    Increase in anxiety due to the recent lung nodule and also do the pandemic as well as plan to move to [place] to be closer to her son this summer.    Allergies: Tape, Augmentin, Ciprofloxacin, Neosporin plus pain, and Other misc    Current Outpatient Medications Ordered in Epic   Medication Sig Dispense Refill   • nicotine (NICODERM) 21 MG/24HR PATCH 24 HR Place 1 Patch on the skin every 24 hours. 30 Patch 1   • hydrOXYzine HCl (ATARAX) 25 MG Tab Take 1 tablet by mouth 3 times a day as needed for Anxiety. 30 tablet 0   • LEVOXYL 88 MCG Tab Take 1 tablet by mouth every morning. ON A EMPTY STOMACH 30 tablet 6   • vitamin D, Ergocalciferol, (DRISDOL) 1.25 MG (03515 UT) Cap capsule Take 1 capsule by mouth every 7 days. 12 capsule 1     No current Epic-ordered facility-administered medications on file.       Past Medical History:   Diagnosis Date   • Anxiety    • Arthritis    • Back pain    • Bleeding from the nose    • Cancer (HCC)     hpv cervical   • Conjunctivitis    • COPD (chronic obstructive pulmonary disease) (HCC)    • COPD (chronic obstructive pulmonary disease) (HCC)    • Hay fever    • Hemorrhoids    • Hypothyroid     Hashimoto's   • Kidney disease    • Measles    • Mumps    • Pneumonia    • Sinusitis    • Urinary tract infection    • Venereal disease        Past Surgical History:   Procedure Laterality Date   • HYSTERECTOMY ROBOTIC  11/6/08    Performed by MARIANELA LAZO at SURGERY [facility] [facility]   • CERVICAL CONIZATION  8/8/08    Performed by SEKOU PEDERSEN at SURGERY SAME DAY [facility] ORS   • ABDOMINAL HYSTERECTOMY TOTAL      2008   • KNEE ORIF      right   • NASAL POLYPECTOMY     • RECTAL POLYPECTOMY     • TONSILLECTOMY     • TUBAL LIGATION         Social History     Tobacco Use   • Smoking status: Current Every Day Smoker     Packs/day: 1.00     Years: 50.00     Pack years: 50.00     Types: Cigarettes   • Smokeless tobacco: Never Used   Substance Use Topics   • Alcohol use: No   • Drug use: No       Social History     Social History Narrative   • Not on file       Family History   Problem Relation Age of Onset   • Cancer Mother         lung   • Lung Disease Mother    • Cancer Father         lung   • Lung Disease Father    • Heart Disease Maternal Grandfather    • Other Daughter         gout   • Thyroid Daughter    • Allergies Daughter    • Cancer Daughter         thyroid   • Diabetes Son    • Allergies Son        Exam: /88 (BP Location: Left arm, Patient Position: Sitting, BP Cuff Size: Adult)   Pulse 61   Temp 36.3 °C (97.4 °F) (Temporal)   Resp 18   Ht 1.651 m (5' 5\")   Wt 51.9 kg (114 lb 6.7 oz)   SpO2 94%  Body mass index is 19.04 kg/m².    General: Normal appearing. No distress.  EYES: Conjunctiva clear lids without ptosis, pupils equal  EARS: Normal shape and contour   Pulmonary: Clear to ausculation.  Normal effort. No rales or wheezing.  Cardiovascular: Regular rate and rhythm without significant murmur.   Abdomen: Soft, nontender, nondistended. Normal bowel sounds.  Neurologic: Cranial nerves grossly nonfocal  Skin: Warm and dry.  No obvious lesions.  Musculoskeletal: Normal gait. No extremity cyanosis, clubbing, or edema.  Psych: Normal mood and affect. Alert and oriented x3. Judgment and insight is normal.      Assessment/Plan  1. Multiple thyroid nodules  Per endocrine no further evaluation required    2. Primary hypothyroidism  Now on half dose of Levoxyl on Sunday, " will see if thyroid had any role in recent weight loss.  Follow-up pending with endocrine.    3. Pre-diabetes  Controlled with recent weight loss and diet changes.  Will monitor.    4. Chronic obstructive pulmonary disease, unspecified COPD type (HCC)  Denies any pulmonary symptoms to require inhaler.  Declines pulmonary function testing.  She requests referral to pulmonary specialist.  Advised that likely the small lung nodule can be followed up on in 1 year for any interval growth.  She will work on tobacco cessation.  - REFERRAL TO PULMONARY AND SLEEP MEDICINE    5. Dyslipidemia  We will monitor, she declines statin therapy despite elevated 10-year ASCVD risk.    6. Weight loss  Monitor recent weight loss which I suspect is due to change in diet, see last note.  She will follow up with nutrition specialist as she wishes to gain some weight back.    7. Tobacco dependence  - nicotine (NICODERM) 21 MG/24HR PATCH 24 HR; Place 1 Patch on the skin every 24 hours.  Dispense: 30 Patch; Refill: 1    8. Anxiety  Advised not able to prescribe benzodiazepines for chronic anxiety.  She is agreeable to try 12.5-25 mg hydroxyzine as needed and she was advised on potential for sedation.  She declines psychology referral.  - hydrOXYzine HCl (ATARAX) 25 MG Tab; Take 1 tablet by mouth 3 times a day as needed for Anxiety.  Dispense: 30 tablet; Refill: 0    9. Pulmonary nodule  See #4 above  - REFERRAL TO PULMONARY AND SLEEP MEDICINE    10. Centrilobular emphysema (HCC)  See #4 above    11. Polycythemia  She declines evaluation for occult sleep apnea.  Reports remote history of microscopic hematuria which she says was evaluated at that time and benign source.  - URINALYSIS; Future  - CBC WITHOUT DIFFERENTIAL; Future  - JAK2 GENE MUT RFLX CALR RFLX MPL; Future      rtc 2mo        Please note that this dictation was created using voice recognition software. I have made every reasonable attempt to correct obvious errors, but I expect  that there are errors of grammar and possibly content that I did not discover before finalizing the note.

## 2021-03-30 ENCOUNTER — HOSPITAL ENCOUNTER (OUTPATIENT)
Dept: LAB | Facility: MEDICAL CENTER | Age: 71
End: 2021-03-30
Attending: INTERNAL MEDICINE
Payer: MEDICARE

## 2021-03-30 DIAGNOSIS — D75.1 POLYCYTHEMIA: ICD-10-CM

## 2021-03-30 LAB
APPEARANCE UR: CLEAR
BILIRUB UR QL STRIP.AUTO: NEGATIVE
COLOR UR: YELLOW
ERYTHROCYTE [DISTWIDTH] IN BLOOD BY AUTOMATED COUNT: 45.8 FL (ref 35.9–50)
GLUCOSE UR STRIP.AUTO-MCNC: NEGATIVE MG/DL
HCT VFR BLD AUTO: 46.5 % (ref 37–47)
HGB BLD-MCNC: 15.2 G/DL (ref 12–16)
KETONES UR STRIP.AUTO-MCNC: NEGATIVE MG/DL
LEUKOCYTE ESTERASE UR QL STRIP.AUTO: NEGATIVE
MCH RBC QN AUTO: 31.6 PG (ref 27–33)
MCHC RBC AUTO-ENTMCNC: 32.7 G/DL (ref 33.6–35)
MCV RBC AUTO: 96.7 FL (ref 81.4–97.8)
MICRO URNS: NORMAL
NITRITE UR QL STRIP.AUTO: NEGATIVE
PH UR STRIP.AUTO: 6 [PH] (ref 5–8)
PLATELET # BLD AUTO: 223 K/UL (ref 164–446)
PMV BLD AUTO: 11.5 FL (ref 9–12.9)
PROT UR QL STRIP: NEGATIVE MG/DL
RBC # BLD AUTO: 4.81 M/UL (ref 4.2–5.4)
RBC UR QL AUTO: NEGATIVE
SP GR UR STRIP.AUTO: 1.01
UROBILINOGEN UR STRIP.AUTO-MCNC: 0.2 MG/DL
WBC # BLD AUTO: 9.5 K/UL (ref 4.8–10.8)

## 2021-03-30 PROCEDURE — 36415 COLL VENOUS BLD VENIPUNCTURE: CPT

## 2021-03-30 PROCEDURE — 81003 URINALYSIS AUTO W/O SCOPE: CPT

## 2021-03-30 PROCEDURE — 81338 MPL GENE COMMON VARIANTS: CPT

## 2021-03-30 PROCEDURE — 81270 JAK2 GENE: CPT

## 2021-03-30 PROCEDURE — 85027 COMPLETE CBC AUTOMATED: CPT

## 2021-03-30 PROCEDURE — 81219 CALR GENE COM VARIANTS: CPT

## 2021-04-04 LAB — JAK2 P.V617F BLD/T QL: NOT DETECTED

## 2021-04-06 ENCOUNTER — TELEPHONE (OUTPATIENT)
Dept: MEDICAL GROUP | Facility: MEDICAL CENTER | Age: 71
End: 2021-04-06

## 2021-04-06 NOTE — TELEPHONE ENCOUNTER
She said thank you for letting her know ----- Message from Danika Carty M.D. sent at 4/5/2021 10:16 AM PDT -----  Patient know that the red blood cell level returned to normal.  Urine normal specifically no blood.  We will discuss further next visit.

## 2021-04-07 LAB — GENE XXX MUT ANL BLD/T: NOT DETECTED

## 2021-04-10 LAB — MPL P.W515 BLD/T QL: NOT DETECTED

## 2021-04-12 DIAGNOSIS — E55.9 VITAMIN D DEFICIENCY: ICD-10-CM

## 2021-04-12 RX ORDER — ERGOCALCIFEROL 1.25 MG/1
50000 CAPSULE ORAL
Qty: 12 CAPSULE | Refills: 3 | Status: SHIPPED | OUTPATIENT
Start: 2021-04-12 | End: 2021-05-24

## 2021-04-20 ENCOUNTER — HOSPITAL ENCOUNTER (OUTPATIENT)
Dept: RADIOLOGY | Facility: MEDICAL CENTER | Age: 71
End: 2021-04-20
Attending: INTERNAL MEDICINE
Payer: MEDICARE

## 2021-04-20 DIAGNOSIS — Z12.31 ENCOUNTER FOR SCREENING MAMMOGRAM FOR MALIGNANT NEOPLASM OF BREAST: ICD-10-CM

## 2021-04-20 PROCEDURE — 77063 BREAST TOMOSYNTHESIS BI: CPT

## 2021-04-28 ENCOUNTER — APPOINTMENT (RX ONLY)
Dept: URBAN - METROPOLITAN AREA CLINIC 35 | Facility: CLINIC | Age: 71
Setting detail: DERMATOLOGY
End: 2021-04-28

## 2021-04-28 DIAGNOSIS — Z71.89 OTHER SPECIFIED COUNSELING: ICD-10-CM

## 2021-04-28 DIAGNOSIS — D485 NEOPLASM OF UNCERTAIN BEHAVIOR OF SKIN: ICD-10-CM

## 2021-04-28 PROBLEM — D48.5 NEOPLASM OF UNCERTAIN BEHAVIOR OF SKIN: Status: ACTIVE | Noted: 2021-04-28

## 2021-04-28 PROCEDURE — ? COUNSELING

## 2021-04-28 PROCEDURE — ? BIOPSY BY SHAVE METHOD

## 2021-04-28 PROCEDURE — ? SUNSCREEN RECOMMENDATIONS

## 2021-04-28 PROCEDURE — 99212 OFFICE O/P EST SF 10 MIN: CPT | Mod: 25

## 2021-04-28 PROCEDURE — 69100 BIOPSY OF EXTERNAL EAR: CPT

## 2021-04-28 ASSESSMENT — LOCATION DETAILED DESCRIPTION DERM
LOCATION DETAILED: LEFT SUPERIOR HELIX
LOCATION DETAILED: LEFT SUPERIOR HELIX

## 2021-04-28 ASSESSMENT — LOCATION ZONE DERM
LOCATION ZONE: EAR
LOCATION ZONE: EAR

## 2021-04-28 ASSESSMENT — LOCATION SIMPLE DESCRIPTION DERM
LOCATION SIMPLE: LEFT EAR
LOCATION SIMPLE: LEFT EAR

## 2021-04-28 NOTE — PROCEDURE: MIPS QUALITY
Detail Level: Detailed
Quality 226: Preventive Care And Screening: Tobacco Use: Screening And Cessation Intervention: Patient screened for tobacco use, is a smoker AND received Cessation Counseling
Quality 402: Tobacco Use And Help With Quitting Among Adolescents: Patient screened for tobacco and is a smoker AND received Cessation Counseling
Quality 130: Documentation Of Current Medications In The Medical Record: Current Medications Documented

## 2021-04-28 NOTE — PROCEDURE: BIOPSY BY SHAVE METHOD
Detail Level: Simple
Depth Of Biopsy: dermis
Was A Bandage Applied: Yes
Size Of Lesion In Cm: 0
Biopsy Type: H and E
Biopsy Method: Dermablade
Anesthesia Type: 1% lidocaine with 1:100,000 epinephrine and a 1:12 solution of 8.4% sodium bicarbonate
Anesthesia Volume In Cc: 0.4
Hemostasis: Aluminum Chloride
Wound Care: Petrolatum
Dressing: no dressing applied
Destruction After The Procedure: No
Type Of Destruction Used: Curettage
Curettage Text: The wound bed was treated with curettage after the biopsy was performed.
Electrodesiccation And Curettage Text: The wound bed was treated with electrodesiccation and curettage after the biopsy was
Lab: [id number]
Lab Facility: 
Consent: Written consent was obtained and risks were reviewed including but not limited to scarring, infection, bleeding, scabbing, incomplete removal, nerve damage and allergy to anesthesia.
Post-Care Instructions: I reviewed with the patient in detail post-care instructions. Patient is to keep the biopsy site dry overnight, and then apply white petrolatum twice daily until healed. Patient may apply hydrogen peroxide soaks to remove any crusting.
Notification Instructions: Patient will be notified of biopsy results. However, patient instructed to call the office if not contacted within 2 weeks.
Billing Type: Third-Party Bill
Information: Selecting Yes will display possible errors in your note based on the variables you have selected. This validation is only offered as a suggestion for you. PLEASE NOTE THAT THE VALIDATION TEXT WILL BE REMOVED WHEN YOU FINALIZE YOUR NOTE. IF YOU WANT TO FAX A PRELIMINARY NOTE YOU WILL NEED TO TOGGLE THIS TO 'NO' IF YOU DO NOT WANT IT IN YOUR FAXED NOTE.

## 2021-05-14 ENCOUNTER — OFFICE VISIT (OUTPATIENT)
Dept: URGENT CARE | Facility: CLINIC | Age: 71
End: 2021-05-14
Payer: MEDICARE

## 2021-05-14 VITALS
HEART RATE: 107 BPM | DIASTOLIC BLOOD PRESSURE: 74 MMHG | WEIGHT: 116 LBS | RESPIRATION RATE: 16 BRPM | BODY MASS INDEX: 19.33 KG/M2 | HEIGHT: 65 IN | SYSTOLIC BLOOD PRESSURE: 128 MMHG | TEMPERATURE: 98.5 F | OXYGEN SATURATION: 93 %

## 2021-05-14 DIAGNOSIS — B96.89 ACUTE BACTERIAL SINUSITIS: ICD-10-CM

## 2021-05-14 DIAGNOSIS — J01.90 ACUTE BACTERIAL SINUSITIS: ICD-10-CM

## 2021-05-14 PROCEDURE — 99213 OFFICE O/P EST LOW 20 MIN: CPT | Performed by: NURSE PRACTITIONER

## 2021-05-14 RX ORDER — DOXYCYCLINE HYCLATE 100 MG
100 TABLET ORAL 2 TIMES DAILY
Qty: 14 TABLET | Refills: 0 | Status: SHIPPED | OUTPATIENT
Start: 2021-05-14 | End: 2021-05-21

## 2021-05-14 ASSESSMENT — ENCOUNTER SYMPTOMS
SINUS PRESSURE: 1
SINUS PAIN: 1

## 2021-05-14 ASSESSMENT — FIBROSIS 4 INDEX: FIB4 SCORE: 1.54

## 2021-05-15 NOTE — PROGRESS NOTES
Subjective:      Guerda Boateng is a 70 y.o. female who presents with Sinus Problem (pressure x1 month ) and Nasal Congestion            Sinus Problem  This is a new problem. Episode onset: pt reports new onset of sinus congestion that started one month ago. Reports it is getting worse. now has PND, increasing ear pressure, sinus pain. No fever or chills. Reports hx of sinus infections and states this is similar. The problem has been gradually worsening since onset. Associated symptoms include congestion and sinus pressure. Past treatments include oral decongestants (OTC antihistamine no drowsy). The treatment provided no relief.       Review of Systems   HENT: Positive for congestion, sinus pressure and sinus pain.    All other systems reviewed and are negative.    Past Medical History:   Diagnosis Date   • Anxiety    • Arthritis    • Back pain    • Bleeding from the nose    • Cancer (HCC)     hpv cervical   • Conjunctivitis    • COPD (chronic obstructive pulmonary disease) (HCC)    • COPD (chronic obstructive pulmonary disease) (HCC)    • Hay fever    • Hemorrhoids    • Hypothyroid     Hashimoto's   • Kidney disease    • Measles    • Mumps    • Pneumonia    • Sinusitis    • Urinary tract infection    • Venereal disease       Past Surgical History:   Procedure Laterality Date   • HYSTERECTOMY ROBOTIC  11/6/08    Performed by MARIANELA LAZO at SURGERY [facility] ORS   • CERVICAL CONIZATION  8/8/08    Performed by SEKOU PEDERSEN at SURGERY SAME DAY [facility] ORS   • ABDOMINAL HYSTERECTOMY TOTAL      2008   • KNEE ORIF      right   • NASAL POLYPECTOMY     • RECTAL POLYPECTOMY     • TONSILLECTOMY     • TUBAL LIGATION        Social History     Socioeconomic History   • Marital status:      Spouse name: Not on file   • Number of children: Not on file   • Years of education: Not on file   • Highest education level: Not on file   Occupational History   • Not on file   Tobacco Use   • Smoking status: Current  "Every Day Smoker     Packs/day: 1.00     Years: 50.00     Pack years: 50.00     Types: Cigarettes   • Smokeless tobacco: Never Used   Vaping Use   • Vaping Use: Never used   Substance and Sexual Activity   • Alcohol use: No   • Drug use: No   • Sexual activity: Not Currently     Partners: Male     Birth control/protection: Other-See Comments     Comment: none   Other Topics Concern   • Not on file   Social History Narrative   • Not on file     Social Determinants of Health     Financial Resource Strain:    • Difficulty of Paying Living Expenses:    Food Insecurity:    • Worried About Running Out of Food in the Last Year:    • Ran Out of Food in the Last Year:    Transportation Needs:    • Lack of Transportation (Medical):    • Lack of Transportation (Non-Medical):    Physical Activity:    • Days of Exercise per Week:    • Minutes of Exercise per Session:    Stress:    • Feeling of Stress :    Social Connections:    • Frequency of Communication with Friends and Family:    • Frequency of Social Gatherings with Friends and Family:    • Attends Anabaptist Services:    • Active Member of Clubs or Organizations:    • Attends Club or Organization Meetings:    • Marital Status:    Intimate Partner Violence:    • Fear of Current or Ex-Partner:    • Emotionally Abused:    • Physically Abused:    • Sexually Abused:           Objective:     /74   Pulse (!) 107   Temp 36.9 °C (98.5 °F) (Temporal)   Resp 16   Ht 1.651 m (5' 5\")   Wt 52.6 kg (116 lb)   LMP  (LMP Unknown)   SpO2 93%   BMI 19.30 kg/m²      Physical Exam  Vitals and nursing note reviewed.   Constitutional:       Appearance: Normal appearance. She is normal weight.   HENT:      Head: Normocephalic and atraumatic.      Right Ear: External ear normal.      Left Ear: External ear normal.      Nose: Congestion present.      Right Sinus: Frontal sinus tenderness present.      Left Sinus: Frontal sinus tenderness present.      Mouth/Throat:      Mouth: Mucous " membranes are moist.      Pharynx: Oropharynx is clear.   Eyes:      Extraocular Movements: Extraocular movements intact.      Pupils: Pupils are equal, round, and reactive to light.   Cardiovascular:      Rate and Rhythm: Normal rate and regular rhythm.   Pulmonary:      Effort: Pulmonary effort is normal.   Musculoskeletal:         General: Normal range of motion.      Cervical back: Normal range of motion.   Skin:     General: Skin is warm and dry.      Capillary Refill: Capillary refill takes less than 2 seconds.   Neurological:      General: No focal deficit present.      Mental Status: She is alert and oriented to person, place, and time. Mental status is at baseline.   Psychiatric:         Mood and Affect: Mood normal.         Speech: Speech normal.         Thought Content: Thought content normal.         Judgment: Judgment normal.                        Assessment/Plan:        1. Acute bacterial sinusitis  - doxycycline (VIBRAMYCIN) 100 MG Tab; Take 1 tablet by mouth 2 times a day for 7 days.  Dispense: 14 tablet; Refill: 0    Continue to take daily antihistamine  Take abx as directed  Increase water intake  Tylenol and ibuprofen as needed for pain  Declines wanting to use a nasal spray  Supportive care, differential diagnoses, and indications for immediate follow-up discussed with patient.    Pathogenesis of diagnosis discussed including typical length and natural progression.      Instructed to return to  or nearest emergency department if symptoms fail to improve, for any change in condition, further concerns, or new concerning symptoms.  Patient states understanding of the plan of care and discharge instructions.

## 2021-05-24 ENCOUNTER — TELEPHONE (OUTPATIENT)
Dept: URGENT CARE | Facility: CLINIC | Age: 71
End: 2021-05-24

## 2021-05-24 ENCOUNTER — HOSPITAL ENCOUNTER (OUTPATIENT)
Facility: MEDICAL CENTER | Age: 71
End: 2021-05-24
Attending: NURSE PRACTITIONER
Payer: MEDICARE

## 2021-05-24 ENCOUNTER — OFFICE VISIT (OUTPATIENT)
Dept: URGENT CARE | Facility: CLINIC | Age: 71
End: 2021-05-24
Payer: MEDICARE

## 2021-05-24 VITALS
RESPIRATION RATE: 15 BRPM | WEIGHT: 114 LBS | OXYGEN SATURATION: 93 % | TEMPERATURE: 97.2 F | SYSTOLIC BLOOD PRESSURE: 122 MMHG | DIASTOLIC BLOOD PRESSURE: 82 MMHG | HEIGHT: 65 IN | BODY MASS INDEX: 18.99 KG/M2 | HEART RATE: 117 BPM

## 2021-05-24 DIAGNOSIS — R82.998 LEUKOCYTES IN URINE: ICD-10-CM

## 2021-05-24 DIAGNOSIS — F17.200 TOBACCO DEPENDENCE: ICD-10-CM

## 2021-05-24 DIAGNOSIS — K12.30 STOMATITIS AND MUCOSITIS: ICD-10-CM

## 2021-05-24 DIAGNOSIS — K05.10 GINGIVITIS: ICD-10-CM

## 2021-05-24 DIAGNOSIS — R31.9 HEMATURIA, UNSPECIFIED TYPE: ICD-10-CM

## 2021-05-24 DIAGNOSIS — K08.9 POOR DENTITION: ICD-10-CM

## 2021-05-24 DIAGNOSIS — R30.0 DYSURIA: ICD-10-CM

## 2021-05-24 DIAGNOSIS — K12.1 STOMATITIS AND MUCOSITIS: ICD-10-CM

## 2021-05-24 LAB
APPEARANCE UR: CLEAR
BILIRUB UR STRIP-MCNC: NORMAL MG/DL
COLOR UR AUTO: NORMAL
GLUCOSE UR STRIP.AUTO-MCNC: NORMAL MG/DL
KETONES UR STRIP.AUTO-MCNC: NORMAL MG/DL
LEUKOCYTE ESTERASE UR QL STRIP.AUTO: NORMAL
NITRITE UR QL STRIP.AUTO: NORMAL
PH UR STRIP.AUTO: 6 [PH] (ref 5–8)
PROT UR QL STRIP: NORMAL MG/DL
RBC UR QL AUTO: NORMAL
SP GR UR STRIP.AUTO: 1.02
UROBILINOGEN UR STRIP-MCNC: 0.2 MG/DL

## 2021-05-24 PROCEDURE — 81002 URINALYSIS NONAUTO W/O SCOPE: CPT | Performed by: NURSE PRACTITIONER

## 2021-05-24 PROCEDURE — 87086 URINE CULTURE/COLONY COUNT: CPT

## 2021-05-24 PROCEDURE — 99214 OFFICE O/P EST MOD 30 MIN: CPT | Mod: 25 | Performed by: NURSE PRACTITIONER

## 2021-05-24 PROCEDURE — 99406 BEHAV CHNG SMOKING 3-10 MIN: CPT | Performed by: NURSE PRACTITIONER

## 2021-05-24 ASSESSMENT — ENCOUNTER SYMPTOMS
ABDOMINAL PAIN: 0
MYALGIAS: 0
SWOLLEN GLANDS: 0
ANOREXIA: 0
FEVER: 0
VOMITING: 0
ARTHRALGIAS: 0
CHANGE IN BOWEL HABIT: 0
SORE THROAT: 0
COUGH: 0

## 2021-05-24 ASSESSMENT — FIBROSIS 4 INDEX: FIB4 SCORE: 1.54

## 2021-05-24 NOTE — PROGRESS NOTES
Guerda Boateng is a 70 y.o. female who presents for Oral Pain (burning, after prescription of doxycycline) and UTI (burning while urinating)      Oral Pain  This is a new (Started after she completed doxycycline prescription.) problem. The current episode started in the past 7 days. The problem occurs constantly. The problem has been gradually worsening. Pertinent negatives include no abdominal pain, anorexia, arthralgias, change in bowel habit, congestion, coughing, fever, myalgias, sore throat, swollen glands or vomiting. She has tried acetaminophen (She states she saw her dentist last week who told her that she had generalized inflammation number gum.) for the symptoms. The treatment provided mild relief.   UTI  This is a new problem. The current episode started in the past 7 days. The problem occurs intermittently. The problem has been unchanged. Pertinent negatives include no abdominal pain, anorexia, arthralgias, change in bowel habit, congestion, coughing, fever, myalgias, sore throat, swollen glands or vomiting. Nothing aggravates the symptoms. She has tried nothing for the symptoms.       Review of Systems   Constitutional: Negative for fever.   HENT: Negative for congestion and sore throat.    Respiratory: Negative for cough.    Gastrointestinal: Negative for abdominal pain, anorexia, change in bowel habit and vomiting.   Musculoskeletal: Negative for arthralgias and myalgias.       Allergies   Allergen Reactions   • Tape Itching     Adhesive tape   • Augmentin Itching   • Ciprofloxacin Shortness of Breath   • Neosporin Plus Pain    • Other Misc      Formaldehyde  Weed killer  Leather glue     Past Medical History:   Diagnosis Date   • Anxiety    • Arthritis    • Back pain    • Bleeding from the nose    • Cancer ([facility])     hpv cervical   • Conjunctivitis    • COPD (chronic obstructive pulmonary disease) (Formerly [facility])    • COPD (chronic obstructive pulmonary disease) (Formerly [facility])    • Hay fever    • Hemorrhoids    •  Hypothyroid     Hashimoto's   • Kidney disease    • Measles    • Mumps    • Pneumonia    • Sinusitis    • Urinary tract infection    • Venereal disease      Past Surgical History:   Procedure Laterality Date   • HYSTERECTOMY ROBOTIC  11/6/08    Performed by MARIANELA LAZO at SURGERY [facility] ORS   • CERVICAL CONIZATION  8/8/08    Performed by SEKOU PEDERSEN at SURGERY SAME DAY [facility] ORS   • ABDOMINAL HYSTERECTOMY TOTAL      2008   • KNEE ORIF      right   • NASAL POLYPECTOMY     • RECTAL POLYPECTOMY     • TONSILLECTOMY     • TUBAL LIGATION       Family History   Problem Relation Age of Onset   • Cancer Mother         lung   • Lung Disease Mother    • Cancer Father         lung   • Lung Disease Father    • Heart Disease Maternal Grandfather    • Other Daughter         gout   • Thyroid Daughter    • Allergies Daughter    • Cancer Daughter         thyroid   • Diabetes Son    • Allergies Son      Social History     Tobacco Use   • Smoking status: Current Every Day Smoker     Packs/day: 1.00     Years: 50.00     Pack years: 50.00     Types: Cigarettes   • Smokeless tobacco: Never Used   Substance Use Topics   • Alcohol use: No     Patient Active Problem List   Diagnosis   • Hashimoto's disease   • Tobacco dependence   • Vaginal itching   • COPD (chronic obstructive pulmonary disease) (HCC)   • Pre-diabetes   • Anxiety   • Preventative health care   • Multiple thyroid nodules   • Primary hypothyroidism   • Vitamin D deficiency   • Osteopenia of left hip   • Dyslipidemia   • Weight loss   • Pulmonary nodule   • Centrilobular emphysema (HCC)   • Polycythemia     Current Outpatient Medications on File Prior to Visit   Medication Sig Dispense Refill   • LEVOXYL 88 MCG Tab Take 1 tablet by mouth every morning. ON A EMPTY STOMACH 30 tablet 6     No current facility-administered medications on file prior to visit.     UA: trace blood, trace leuk      Objective:     /82 (BP Location: Left arm, Patient Position:  "Sitting, BP Cuff Size: Adult)   Pulse (!) 117   Temp 36.2 °C (97.2 °F) (Temporal)   Resp 15   Ht 1.651 m (5' 5\")   Wt 51.7 kg (114 lb)   LMP  (LMP Unknown)   SpO2 93%   BMI 18.97 kg/m²     Physical Exam  Vitals and nursing note reviewed.   Constitutional:       General: She is not in acute distress.     Appearance: Normal appearance. She is normal weight.   HENT:      Head: Normocephalic.      Mouth/Throat:      Lips: Pink.      Mouth: Mucous membranes are moist.      Dentition: Abnormal dentition. Does not have dentures. Dental tenderness, dental caries and gum lesions present. No gingival swelling or dental abscesses.      Tongue: No lesions.      Pharynx: Oropharynx is clear.     Cardiovascular:      Rate and Rhythm: Normal rate.   Pulmonary:      Effort: Pulmonary effort is normal.   Musculoskeletal:         General: Normal range of motion.      Cervical back: Normal range of motion.   Skin:     General: Skin is warm and dry.      Capillary Refill: Capillary refill takes less than 2 seconds.   Neurological:      Mental Status: She is alert and oriented to person, place, and time.   Psychiatric:         Mood and Affect: Mood normal.         Behavior: Behavior normal.         Thought Content: Thought content normal.         Assessment /Associated Orders:      1. Stomatitis and mucositis  nystatin (MYCOSTATIN) 903385 UNIT/ML Suspension   2. Gingivitis     3. Dysuria  POCT Urinalysis    URINE CULTURE(NEW)   4. Leukocytes in urine  URINE CULTURE(NEW)   5. Hematuria, unspecified type  URINE CULTURE(NEW)   6. Poor dentition     7. Tobacco dependence           Medical Decision Making:    Pt is clinically stable at today's acute urgent care visit.  No acute distress noted. Appropriate for outpatient management at this time.   Acute problem today with uncertain prognosis.     Nystatin orally for stomatitis  Encouraged good oral care and FV with dentist   No antibiotics for leukocytes and hematuria in her urine " with dysuria since she just completed doxycycline.  The abnormal findings could be contaminant.  Await for urine culture to determine treatment      Advised to follow-up with the primary care provider for recheck, reevaluation, and consideration of further management if necessary.   Discussed management options (risks,benefits, and alternatives to treatment). Expressed understanding and the treatment plan was agreed upon. Questions were encouraged and answered     > 3 minutes was spent in educating pt of  health risks  associated with tobacco, nicotine, and vaping. Verbalized understanding.     Return to urgent care prn if new or worsening sx or if there is no improvement in condition prn . Red flags discussed and indications to immediately call 911 or present to the Emergency Department.     I personally reviewed prior external notes and test results pertinent to today's visit.  I have independently reviewed and interpreted all diagnostics ordered during this urgent care acute visit.  Was seen in urgent care on May 14 for an acute bacterial sinusitis.  She was started on doxycycline at that time.  She completed her antibiotics as they were prescribed to her.  No frequent urinary tract infections  She does have a history of dental problems and has been seen in urgent care in the past for these.    Time spent evaluating this patient was at least 30 minutes and includes preparing for visit, counseling/education, exam and evaluation, obtaining history, independent interpretation, ordering lab/test/procedures,medication management and documentation.

## 2021-05-25 ENCOUNTER — TELEPHONE (OUTPATIENT)
Dept: URGENT CARE | Facility: CLINIC | Age: 71
End: 2021-05-25

## 2021-05-26 LAB
BACTERIA UR CULT: NORMAL
SIGNIFICANT IND 70042: NORMAL
SITE SITE: NORMAL
SOURCE SOURCE: NORMAL

## 2021-05-26 NOTE — TELEPHONE ENCOUNTER
Went over Negative Urine Culture results collected on 05/24/21.  Patient understood and had no further questions at this time.

## 2021-05-26 NOTE — TELEPHONE ENCOUNTER
----- Message from ASHLEY WaldronPNadiaNNadia sent at 5/25/2021  4:10 PM PDT -----  Please notify pt that urine culture was negative.   ASHLEY WaldronPNadiaNNadia

## 2021-06-02 ENCOUNTER — APPOINTMENT (RX ONLY)
Dept: URBAN - METROPOLITAN AREA CLINIC 36 | Facility: CLINIC | Age: 71
Setting detail: DERMATOLOGY
End: 2021-06-02

## 2021-06-02 PROBLEM — C44.92 SQUAMOUS CELL CARCINOMA OF SKIN, UNSPECIFIED: Status: ACTIVE | Noted: 2021-06-02

## 2021-06-02 PROCEDURE — 99212 OFFICE O/P EST SF 10 MIN: CPT

## 2021-06-02 PROCEDURE — ? DEFER

## 2021-06-02 NOTE — PROCEDURE: MIPS QUALITY
Quality 111:Pneumonia Vaccination Status For Older Adults: Pneumococcal Vaccination Previously Received
Detail Level: Detailed
Quality 130: Documentation Of Current Medications In The Medical Record: Current Medications Documented
Quality 226: Preventive Care And Screening: Tobacco Use: Screening And Cessation Intervention: Patient screened for tobacco use, is a smoker AND received Cessation Counseling

## 2021-06-02 NOTE — PROCEDURE: DEFER
Detail Level: Detailed
Reason To Defer Override: no visible tumor found
Scheduling Instructions (Optional): Return to clinic in 1 month for recheck
Introduction Text (Please End With A Colon): The following procedure was deferred: mohs
Instructions (Optional): Patient will return to clinic in one month or sooner if lesion returns.

## 2021-07-02 ENCOUNTER — OFFICE VISIT (OUTPATIENT)
Dept: URGENT CARE | Facility: CLINIC | Age: 71
End: 2021-07-02
Payer: MEDICARE

## 2021-07-02 VITALS
OXYGEN SATURATION: 92 % | TEMPERATURE: 98.9 F | HEART RATE: 92 BPM | SYSTOLIC BLOOD PRESSURE: 124 MMHG | BODY MASS INDEX: 18.99 KG/M2 | HEIGHT: 65 IN | RESPIRATION RATE: 16 BRPM | DIASTOLIC BLOOD PRESSURE: 64 MMHG | WEIGHT: 114 LBS

## 2021-07-02 DIAGNOSIS — K04.7 DENTAL ABSCESS: ICD-10-CM

## 2021-07-02 DIAGNOSIS — K05.10 GINGIVITIS: ICD-10-CM

## 2021-07-02 DIAGNOSIS — F17.200 TOBACCO DEPENDENCE: ICD-10-CM

## 2021-07-02 PROCEDURE — 99214 OFFICE O/P EST MOD 30 MIN: CPT | Performed by: PHYSICIAN ASSISTANT

## 2021-07-02 RX ORDER — AMOXICILLIN AND CLAVULANATE POTASSIUM 875; 125 MG/1; MG/1
1 TABLET, FILM COATED ORAL 2 TIMES DAILY
Qty: 14 TABLET | Refills: 0 | Status: SHIPPED | OUTPATIENT
Start: 2021-07-02 | End: 2021-07-09

## 2021-07-02 RX ORDER — ACETAMINOPHEN 160 MG
TABLET,DISINTEGRATING ORAL
COMMUNITY
End: 2021-09-15

## 2021-07-02 ASSESSMENT — ENCOUNTER SYMPTOMS: FEVER: 0

## 2021-07-02 ASSESSMENT — FIBROSIS 4 INDEX: FIB4 SCORE: 1.54

## 2021-07-02 NOTE — PROGRESS NOTES
"Subjective:   Guerda Boateng  is a 70 y.o. female who presents for Gum Problem (ongoing issue ) and Dental Pain (worsening since Monday )    Patient identity confirmed using two patient identifiers of last name and date of birth.      Patient presents to  with 5 day history of worsening pain and swelling of the left upper gum line. She has history of dental issues in this area. She did see dental several weeks ago who noted that she needed a root canal and referred her to endodontist. She reports that she does not have health insurance and decided to pursue possible extractions for dentures. However, on Monday she began having significant increase in pain and swelling of the gums. No fever.   + tobacco dependence.           Dental Pain   Pertinent negatives include no fever.     Review of Systems   Constitutional: Negative for fever.   HENT:        Dental pain   All other systems reviewed and are negative.    Allergies   Allergen Reactions   • Tape Itching     Adhesive tape   • Augmentin Itching   • Ciprofloxacin Shortness of Breath   • Neosporin Plus Pain    • Other Misc      Formaldehyde  Weed killer  Leather glue     Reviewed past medical, surgical , social and family history.  Reviewed prescription and over-the-counter medications with patient and electronic health record today.     Objective:   /64   Pulse 92   Temp 37.2 °C (98.9 °F) (Temporal)   Resp 16   Ht 1.651 m (5' 5\")   Wt 51.7 kg (114 lb)   LMP  (LMP Unknown)   SpO2 92%   BMI 18.97 kg/m²   Physical Exam  Vitals reviewed.   Constitutional:       Appearance: She is well-developed.   HENT:      Head: Normocephalic and atraumatic.      Right Ear: External ear normal.      Left Ear: External ear normal.      Nose: Nose normal.      Mouth/Throat:      Mouth: Mucous membranes are moist.        Comments: Tooth #11 with abscess formation. All gums with general erythema and edema    Eyes:      Extraocular Movements: Extraocular movements " "intact.      Conjunctiva/sclera: Conjunctivae normal.      Pupils: Pupils are equal, round, and reactive to light.   Cardiovascular:      Rate and Rhythm: Normal rate and regular rhythm.      Heart sounds: Normal heart sounds.   Pulmonary:      Effort: Pulmonary effort is normal.      Breath sounds: Normal breath sounds.   Musculoskeletal:         General: Normal range of motion.      Cervical back: Normal range of motion and neck supple.   Lymphadenopathy:      Cervical: No cervical adenopathy.   Skin:     General: Skin is warm and dry.      Findings: No rash.   Neurological:      Mental Status: She is alert and oriented to person, place, and time.      Cranial Nerves: Cranial nerves are intact.      Sensory: Sensation is intact.      Motor: Motor function is intact.      Coordination: Coordination is intact.   Psychiatric:         Attention and Perception: Attention normal.         Mood and Affect: Mood normal.         Speech: Speech normal.         Behavior: Behavior normal.         Thought Content: Thought content normal.         Judgment: Judgment normal.           Assessment/Plan:   1. Dental abscess  - amoxicillin-clavulanate (AUGMENTIN) 875-125 MG Tab; Take 1 tablet by mouth 2 times a day for 7 days.  Dispense: 14 tablet; Refill: 0    2. Gingivitis    3. Tobacco dependence  Greater than 3 minutes spent counseling on nicotine dependence, associated disease process and affect on present illness. Counseled regarding cessation. Not ready to quit at this time.   Because of this voicem  Patient reports allergy to Augmentin several years ago.  She states that she had \"slight itching\".  She denies any associated rash with this itching, sensation of throat closing, shortness of breath or difficulty breathing.  Unfortunately, patient does have prior history of C. difficile colitis several years ago.  Given her past history of C. difficile this case was discussed at length with inpatient pharmacist MIKALA Perez.  Given the " minimal questionable allergic response to Augmentin and the fact patient would be at potential risk for C. difficile with use of clindamycin the pharmacist recommends a trial of Augmentin.  Patient may consider taking a dose of Benadryl with the Augmentin or have Benadryl on hand.  If she develops any concern for allergic response she is to take Benadryl and seek care in the emergency department immediately.  This is discussed at length with the patient, risk versus benefit is discussed and reviewed and patient is agreeable with plan and wishes to proceed.    Patient will be treated with Augmentin as above.  Counseled patient on the importance of tobacco cessation. Keep appointment with Dental as scheduled.   Differential diagnosis, natural history, supportive care, and indications for immediate follow-up discussed.     Red flag warning symptoms and strict ER/follow-up precautions given.  The patient demonstrated a good understanding and agreed with the treatment plan.    Upon entering exam room I ensured patient was wearing a mask.  This provider wore appropriate PPE throughout entire visit.  Patient wore mask entire visit except for a brief period while examining oropharynx.      Please note that this note was created using voice recognition speech to text software. Every effort has been made to correct obvious errors.  However, I expect there are errors of grammar and possibly context that were not discovered prior to finalizing the note  IDRIS Velasquez PA-C

## 2021-07-07 ENCOUNTER — TELEPHONE (OUTPATIENT)
Dept: URGENT CARE | Facility: CLINIC | Age: 71
End: 2021-07-07

## 2021-07-07 ENCOUNTER — APPOINTMENT (RX ONLY)
Dept: URBAN - METROPOLITAN AREA CLINIC 36 | Facility: CLINIC | Age: 71
Setting detail: DERMATOLOGY
End: 2021-07-07

## 2021-07-07 PROBLEM — C44.229 SQUAMOUS CELL CARCINOMA OF SKIN OF LEFT EAR AND EXTERNAL AURICULAR CANAL: Status: ACTIVE | Noted: 2021-07-07

## 2021-07-07 PROCEDURE — ? OBSERVATION

## 2021-07-07 NOTE — PROCEDURE: MIPS QUALITY
Quality 226: Preventive Care And Screening: Tobacco Use: Screening And Cessation Intervention: Patient screened for tobacco use and is an ex/non-smoker
Quality 111:Pneumonia Vaccination Status For Older Adults: Pneumococcal Vaccination Previously Received
Quality 130: Documentation Of Current Medications In The Medical Record: Current Medications Documented
Detail Level: Detailed
Quality 226: Preventive Care And Screening: Tobacco Use: Screening And Cessation Intervention: Patient screened for tobacco use, is a smoker AND received Cessation Counseling

## 2021-08-16 ENCOUNTER — TELEPHONE (OUTPATIENT)
Dept: ENDOCRINOLOGY | Facility: MEDICAL CENTER | Age: 71
End: 2021-08-16

## 2021-08-16 DIAGNOSIS — R53.83 FATIGUE, UNSPECIFIED TYPE: ICD-10-CM

## 2021-08-16 NOTE — TELEPHONE ENCOUNTER
1:31 PM     Patient called in.   States she is going to lab tomorrow to get her blood drawn and would like Dr. Jacobson to add a serum corisol level and a ACTH lab onto her orders.       Please advise

## 2021-08-17 NOTE — PROGRESS NOTES
Received report from Jania SALES, POC discussed. Pt sitting up in bed, alert unlabored breathing. Stress test and NPO after midnight discussed with patient, verbalized understanding. Denies any further questions or concerns at this time.    No

## 2021-08-18 ENCOUNTER — HOSPITAL ENCOUNTER (OUTPATIENT)
Dept: LAB | Facility: MEDICAL CENTER | Age: 71
End: 2021-08-18
Attending: INTERNAL MEDICINE
Payer: MEDICARE

## 2021-08-18 DIAGNOSIS — E06.3 HASHIMOTO'S DISEASE: ICD-10-CM

## 2021-08-18 DIAGNOSIS — E03.9 PRIMARY HYPOTHYROIDISM: ICD-10-CM

## 2021-08-18 DIAGNOSIS — R53.83 FATIGUE, UNSPECIFIED TYPE: ICD-10-CM

## 2021-08-18 DIAGNOSIS — E55.9 VITAMIN D DEFICIENCY: ICD-10-CM

## 2021-08-18 LAB
25(OH)D3 SERPL-MCNC: 70 NG/ML (ref 30–100)
ALBUMIN SERPL BCP-MCNC: 4.4 G/DL (ref 3.2–4.9)
ALBUMIN/GLOB SERPL: 1.6 G/DL
ALP SERPL-CCNC: 75 U/L (ref 30–99)
ALT SERPL-CCNC: 14 U/L (ref 2–50)
ANION GAP SERPL CALC-SCNC: 11 MMOL/L (ref 7–16)
AST SERPL-CCNC: 14 U/L (ref 12–45)
BILIRUB SERPL-MCNC: 0.7 MG/DL (ref 0.1–1.5)
BUN SERPL-MCNC: 20 MG/DL (ref 8–22)
CALCIUM SERPL-MCNC: 9.5 MG/DL (ref 8.5–10.5)
CHLORIDE SERPL-SCNC: 102 MMOL/L (ref 96–112)
CO2 SERPL-SCNC: 28 MMOL/L (ref 20–33)
CORTIS SERPL-MCNC: 15.1 UG/DL (ref 0–23)
CREAT SERPL-MCNC: 0.86 MG/DL (ref 0.5–1.4)
FASTING STATUS PATIENT QL REPORTED: NORMAL
GLOBULIN SER CALC-MCNC: 2.7 G/DL (ref 1.9–3.5)
GLUCOSE SERPL-MCNC: 125 MG/DL (ref 65–99)
POTASSIUM SERPL-SCNC: 4.3 MMOL/L (ref 3.6–5.5)
PROT SERPL-MCNC: 7.1 G/DL (ref 6–8.2)
SODIUM SERPL-SCNC: 141 MMOL/L (ref 135–145)
T4 FREE SERPL-MCNC: 1.77 NG/DL (ref 0.93–1.7)
TSH SERPL DL<=0.005 MIU/L-ACNC: 1.71 UIU/ML (ref 0.38–5.33)

## 2021-08-18 PROCEDURE — 83516 IMMUNOASSAY NONANTIBODY: CPT

## 2021-08-18 PROCEDURE — 82533 TOTAL CORTISOL: CPT

## 2021-08-18 PROCEDURE — 84443 ASSAY THYROID STIM HORMONE: CPT

## 2021-08-18 PROCEDURE — 36415 COLL VENOUS BLD VENIPUNCTURE: CPT

## 2021-08-18 PROCEDURE — 82024 ASSAY OF ACTH: CPT

## 2021-08-18 PROCEDURE — 82306 VITAMIN D 25 HYDROXY: CPT

## 2021-08-18 PROCEDURE — 84439 ASSAY OF FREE THYROXINE: CPT

## 2021-08-18 PROCEDURE — 80053 COMPREHEN METABOLIC PANEL: CPT

## 2021-08-20 LAB — ACTH PLAS-MCNC: 40.5 PG/ML (ref 7.2–63.3)

## 2021-08-21 LAB — 21HYDROXYLASE AB SER QL: NEGATIVE

## 2021-09-15 ENCOUNTER — OFFICE VISIT (OUTPATIENT)
Dept: ENDOCRINOLOGY | Facility: MEDICAL CENTER | Age: 71
End: 2021-09-15
Attending: INTERNAL MEDICINE
Payer: MEDICARE

## 2021-09-15 VITALS
DIASTOLIC BLOOD PRESSURE: 68 MMHG | WEIGHT: 114 LBS | OXYGEN SATURATION: 94 % | SYSTOLIC BLOOD PRESSURE: 110 MMHG | HEART RATE: 110 BPM | BODY MASS INDEX: 18.99 KG/M2 | HEIGHT: 65 IN

## 2021-09-15 DIAGNOSIS — E55.9 VITAMIN D DEFICIENCY: ICD-10-CM

## 2021-09-15 DIAGNOSIS — E06.3 HASHIMOTO'S DISEASE: ICD-10-CM

## 2021-09-15 DIAGNOSIS — E04.2 MULTIPLE THYROID NODULES: ICD-10-CM

## 2021-09-15 DIAGNOSIS — E03.9 PRIMARY HYPOTHYROIDISM: ICD-10-CM

## 2021-09-15 DIAGNOSIS — R73.01 IMPAIRED FASTING BLOOD SUGAR: ICD-10-CM

## 2021-09-15 PROCEDURE — 99214 OFFICE O/P EST MOD 30 MIN: CPT | Performed by: INTERNAL MEDICINE

## 2021-09-15 PROCEDURE — 99211 OFF/OP EST MAY X REQ PHY/QHP: CPT | Performed by: INTERNAL MEDICINE

## 2021-09-15 RX ORDER — TRAMADOL HYDROCHLORIDE 50 MG/1
TABLET ORAL
COMMUNITY
Start: 2021-08-05 | End: 2021-09-15

## 2021-09-15 RX ORDER — CHLORHEXIDINE GLUCONATE ORAL RINSE 1.2 MG/ML
SOLUTION DENTAL
COMMUNITY
Start: 2021-07-09 | End: 2021-09-15

## 2021-09-15 RX ORDER — AMOXICILLIN 500 MG/1
TABLET, FILM COATED ORAL
COMMUNITY
Start: 2021-09-01 | End: 2021-09-15

## 2021-09-15 RX ORDER — ERGOCALCIFEROL 1.25 MG/1
50000 CAPSULE ORAL
COMMUNITY
Start: 2021-07-02 | End: 2021-09-15 | Stop reason: SDUPTHER

## 2021-09-15 RX ORDER — HYDROCODONE BITARTRATE AND ACETAMINOPHEN 5; 325 MG/1; MG/1
TABLET ORAL
COMMUNITY
Start: 2021-07-09 | End: 2021-09-15

## 2021-09-15 RX ORDER — ERGOCALCIFEROL 1.25 MG/1
50000 CAPSULE ORAL
Qty: 12 CAPSULE | Refills: 3 | Status: SHIPPED
Start: 2021-09-15 | End: 2021-09-21

## 2021-09-15 RX ORDER — LEVOTHYROXINE SODIUM 88 UG/1
88 TABLET ORAL EVERY MORNING
Qty: 90 TABLET | Refills: 3 | Status: SHIPPED
Start: 2021-09-15 | End: 2021-09-24

## 2021-09-15 ASSESSMENT — FIBROSIS 4 INDEX: FIB4 SCORE: 1.16

## 2021-09-15 NOTE — PROGRESS NOTES
Chief Complaint: Follow up for Primary Hypothyroidism, and vitamin  D deficiency and history of thyroid nodule    HPI:     Guerda Boateng is a 69 y.o. female here for follow up of Primary Hypothyroidism.  Since last visit patient reports feeling excellent.  She remains on Levoxyl 88 MCG daily which has been her dose for the past 12 months.   She reports excellent compliance and denies missing any daily doses.   She takes thyroid hormone prior to breakfast.   She  denies taking any iron, calcium supplements or antacids.      Weight has been stable.    She denies palpitations and tremors  She has gingivitis and has very bad dental issues and needs 20 teeth extracted  She is moving to [place], [place] in November      On her last visit her free T4 was slightly elevated and I advised her to reduce her Levoxyl to 1/2 pill on Sunday but she forgot to do this    Her most recent TSH is still normal 1.7 her free T4 is 1.77 on August 2021  I believe that her free T4 is elevated because of a lab assay issue      She takes ergocalciferol 50,000 units weekly and her vitamin D was 70 on August 2021      Lastly she has a history of a hyperechoic 1 cm solid nodule on the right upper lobe which is benign per my read.  She denies a family history of thyroid cancer and explained her that biopsy is not indicated for this lesion  Radiology does not recommend any further follow-up on this lesion  Her neck exam today was unremarkable        Patient's medications, allergies, and social histories were reviewed and updated as appropriate.      ROS:     CONS:     No fever, no chills   EYES:     No diplopia, no blurry vision   CV:           No chest pain, no palpitations   PULM:     No SOB, no cough, no hemoptysis.   GI:            No nausea, no vomiting, no diarrhea, no constipation   ENDO:     No polyuria, no polydipsia, no heat intolerance, no cold intolerance       Past Medical History:  Problem List:  2021-03: Weight  "loss  2021-03: Pulmonary nodule  2021-03: Centrilobular emphysema (HCC)  2021-03: Polycythemia  2021-03: Dyslipidemia  2020-08: Osteopenia of left hip  2020-05: Primary hypothyroidism  2020-05: Vitamin D deficiency  2019-05: Multiple thyroid nodules  2019-05: Anxiety  2019-05: Preventative health care  2019-04: Pre-diabetes  2019-04: Chest pain  2019-04: Hypokalemia  2019-01: Hashimoto's disease  2019-01: Tobacco dependence  2019-01: Vaginal itching  2019-01: COPD (chronic obstructive pulmonary disease) ([facility])      Past Surgical History:  Past Surgical History:   Procedure Laterality Date   • HYSTERECTOMY ROBOTIC  11/6/08    Performed by MARIANELA LAZO at SURGERY [facility] ORS   • CERVICAL CONIZATION  8/8/08    Performed by SEKOU PEDERSEN at SURGERY SAME DAY [facility] ORS   • ABDOMINAL HYSTERECTOMY TOTAL      2008   • KNEE ORIF      right   • NASAL POLYPECTOMY     • RECTAL POLYPECTOMY     • TONSILLECTOMY     • TUBAL LIGATION          Allergies:  Tape; Augmentin; Ciprofloxacin; and Other misc     Social History:  Social History     Tobacco Use   • Smoking status: Current Every Day Smoker     Packs/day: 1.00     Years: 50.00     Pack years: 50.00     Types: Cigarettes   • Smokeless tobacco: Never Used   Vaping Use   • Vaping Use: Never used   Substance Use Topics   • Alcohol use: No   • Drug use: No        Family History:   family history includes Allergies in her daughter and son; Cancer in her daughter, father, and mother; Diabetes in her son; Heart Disease in her maternal grandfather; Lung Disease in her father and mother; Other in her daughter; Thyroid in her daughter.      PHYSICAL EXAM:   Vital signs: /68 (BP Location: Left arm, Patient Position: Sitting, BP Cuff Size: Adult)   Pulse (!) 110   Ht 1.651 m (5' 5\")   Wt 51.7 kg (114 lb)   LMP  (LMP Unknown)   SpO2 94%   BMI 18.97 kg/m²   GENERAL: Well-developed, well-nourished in no apparent distress.   EYE:  No ocular asymmetry, PERRLA  HENT: Pink, " moist mucous membranes.    NECK: No thyromegaly.  Palpable nodule in the right upper lobe  CARDIOVASCULAR:  No murmurs  LUNGS: Clear breath sounds  ABDOMEN: Soft, nontender   EXTREMITIES: No clubbing, cyanosis, or edema.   NEUROLOGICAL: No gross focal motor abnormalities   LYMPH: No cervical adenopathy palpated.   SKIN: No rashes, lesions.       Labs:  Lab Results   Component Value Date/Time    SODIUM 141 08/18/2021 08:28 AM    POTASSIUM 4.3 08/18/2021 08:28 AM    CHLORIDE 102 08/18/2021 08:28 AM    CO2 28 08/18/2021 08:28 AM    ANION 11.0 08/18/2021 08:28 AM    GLUCOSE 125 (H) 08/18/2021 08:28 AM    BUN 20 08/18/2021 08:28 AM    CREATININE 0.86 08/18/2021 08:28 AM    CREATININE 0.81 05/10/2010 09:06 AM    CALCIUM 9.5 08/18/2021 08:28 AM    ASTSGOT 14 08/18/2021 08:28 AM    ALTSGPT 14 08/18/2021 08:28 AM    TBILIRUBIN 0.7 08/18/2021 08:28 AM    ALBUMIN 4.4 08/18/2021 08:28 AM    TOTPROTEIN 7.1 08/18/2021 08:28 AM    GLOBULIN 2.7 08/18/2021 08:28 AM    AGRATIO 1.6 08/18/2021 08:28 AM       Lab Results   Component Value Date/Time    SODIUM 143 05/13/2020 0947    POTASSIUM 4.8 05/13/2020 0947    CHLORIDE 105 05/13/2020 0947    CO2 25 05/13/2020 0947    GLUCOSE 111 (H) 05/13/2020 0947    BUN 20 05/13/2020 0947    CREATININE 0.72 05/13/2020 0947    CALCIUM 9.6 05/13/2020 0947    ANION 13.0 05/13/2020 0947       Lab Results   Component Value Date/Time    CHOLSTRLTOT 220 (H) 05/13/2020 0947    TRIGLYCERIDE 122 05/13/2020 0947    HDL 59 05/13/2020 0947     (H) 05/13/2020 0947       Lab Results   Component Value Date/Time    TSHULTRASEN 0.689 05/13/2020 0947     Lab Results   Component Value Date/Time    FREET4 1.88 (H) 05/13/2020 0947     Lab Results   Component Value Date/Time    FREET3 2.94 06/19/2019 0814     No results found for: THYSTIMIG    No results found for: MICROSOMALA      Imaging:      ASSESSMENT/PLAN:     1. Primary hypothyroidism  Controlled  TSH is normal, free T4 is elevated because of issues with  lab assay  Continue Levoxyl 88 mcg daily  She is going to move to [facility]  Recommend that she get annual or biannual monitoring of her thyroid function with her new primary care in [place]    2. Hashimoto's disease  This is the etiology of her hypothyroidism    3. Vitamin D deficiency  Controlled  Continue ergocalciferol 50,000 units weekly  Recommend repeating vitamin D levels every 3 to 6 months with new primary care    4. Impaired fasting blood sugar  Unstable  This is most likely secondary to her gingivitis and poor dental health  I also recommend watching her carbohydrate intake and exercising regularly  Reviewed increased risk for developing type 2 diabetes    5. Multiple thyroid nodules  She has a low risk thyroid nodule for which radiology has recommended no further follow-up  I recommend that she mention this to her new primary care when she moves to [place]      Return if symptoms worsen or fail to improve.      Thank you kindly for allowing me to participate in the thyroid care plan for this patient.    Jose Miguel Jacobson MD, FACE, ECNU  05/18/20    CC:   Danika Carty M.D.

## 2021-09-21 DIAGNOSIS — E55.9 VITAMIN D DEFICIENCY: ICD-10-CM

## 2021-09-21 RX ORDER — ERGOCALCIFEROL 1.25 MG/1
50000 CAPSULE ORAL
Qty: 12 CAPSULE | Refills: 3 | Status: SHIPPED | OUTPATIENT
Start: 2021-09-21

## 2021-09-21 NOTE — TELEPHONE ENCOUNTER
Received request via: Pharmacy    Was the patient seen in the last year in this department? Yes    Does the patient have an active prescription (recently filled or refills available) for medication(s) requested? No     REQUESTED MEDICATION:   Requested Prescriptions     Pending Prescriptions Disp Refills   • vitamin D2, Ergocalciferol, (DRISDOL) 1.25 MG (16690 UT) Cap capsule [Pharmacy Med Name: VITAMIN D2 50,000IU (ERGO) CAP RX] 12 Capsule 3     Sig: TAKE 1 CAPSULE BY MOUTH EVERY 7 DAYS

## 2021-09-23 DIAGNOSIS — E03.9 PRIMARY HYPOTHYROIDISM: ICD-10-CM

## 2021-09-24 RX ORDER — LEVOTHYROXINE SODIUM 88 UG/1
TABLET ORAL
Qty: 90 TABLET | Refills: 2 | Status: SHIPPED | OUTPATIENT
Start: 2021-09-24

## 2021-09-24 NOTE — TELEPHONE ENCOUNTER
Received request via: Pharmacy    Was the patient seen in the last year in this department? Yes    Does the patient have an active prescription (recently filled or refills available) for medication(s) requested? No     REQUESTED MEDICATION:   Requested Prescriptions     Pending Prescriptions Disp Refills   • LEVOXYL 88 MCG Tab [Pharmacy Med Name: LEVOXYL 0.088MG (88MCG) TABLETS] 30 Tablet      Sig: TAKE 1 TABLET BY MOUTH EVERY MORNING ON AN EMPTY STOMACH

## 2022-02-03 ENCOUNTER — HOSPITAL ENCOUNTER (OUTPATIENT)
Dept: LAB | Facility: MEDICAL CENTER | Age: 72
End: 2022-02-03
Attending: INTERNAL MEDICINE
Payer: MEDICARE

## 2022-02-03 LAB
AMYLASE SERPL-CCNC: 67 U/L (ref 20–103)
LIPASE SERPL-CCNC: 61 U/L (ref 11–82)

## 2022-02-03 PROCEDURE — 82784 ASSAY IGA/IGD/IGG/IGM EACH: CPT

## 2022-02-03 PROCEDURE — 83690 ASSAY OF LIPASE: CPT

## 2022-02-03 PROCEDURE — 82150 ASSAY OF AMYLASE: CPT

## 2022-02-03 PROCEDURE — 86364 TISS TRNSGLTMNASE EA IG CLAS: CPT

## 2022-02-03 PROCEDURE — 36415 COLL VENOUS BLD VENIPUNCTURE: CPT

## 2022-02-04 ENCOUNTER — HOSPITAL ENCOUNTER (OUTPATIENT)
Facility: MEDICAL CENTER | Age: 72
End: 2022-02-04
Attending: INTERNAL MEDICINE
Payer: MEDICARE

## 2022-02-04 PROCEDURE — 82710 FATS/LIPIDS FECES QUANT: CPT

## 2022-02-05 LAB
IGA SERPL-MCNC: 100 MG/DL (ref 68–408)
TTG IGA SER IA-ACNC: <2 U/ML (ref 0–3)

## 2022-02-08 LAB — FAT 24H STL-MRATE: 1.5 G/D (ref 0–6)

## 2024-09-12 NOTE — PROGRESS NOTES
Dr Mabry notified of pt status, pt is anxious and requesting medication to help sleep.    LLQ pain sudden onset few hours ago Hx Ovarian Cyst